# Patient Record
Sex: FEMALE | Race: BLACK OR AFRICAN AMERICAN | NOT HISPANIC OR LATINO | Employment: FULL TIME | URBAN - METROPOLITAN AREA
[De-identification: names, ages, dates, MRNs, and addresses within clinical notes are randomized per-mention and may not be internally consistent; named-entity substitution may affect disease eponyms.]

---

## 2018-01-23 ENCOUNTER — HOSPITAL ENCOUNTER (EMERGENCY)
Facility: OTHER | Age: 33
Discharge: HOME | End: 2018-01-23
Attending: INTERNAL MEDICINE
Payer: MEDICAID

## 2018-01-23 VITALS
WEIGHT: 218 LBS | RESPIRATION RATE: 18 BRPM | SYSTOLIC BLOOD PRESSURE: 191 MMHG | DIASTOLIC BLOOD PRESSURE: 84 MMHG | BODY MASS INDEX: 40.12 KG/M2 | HEART RATE: 71 BPM | HEIGHT: 62 IN | OXYGEN SATURATION: 98 %

## 2018-01-23 DIAGNOSIS — R52 PAIN IN SURGICAL SCAR: Primary | ICD-10-CM

## 2018-01-23 DIAGNOSIS — L90.5 PAIN IN SURGICAL SCAR: Primary | ICD-10-CM

## 2018-01-23 LAB
B-HCG UR QL: NEGATIVE
CTP QC/QA: YES

## 2018-01-23 PROCEDURE — 25000003 PHARM REV CODE 250: Performed by: NURSE PRACTITIONER

## 2018-01-23 PROCEDURE — 99283 EMERGENCY DEPT VISIT LOW MDM: CPT | Mod: 25

## 2018-01-23 PROCEDURE — 81025 URINE PREGNANCY TEST: CPT | Performed by: NURSE PRACTITIONER

## 2018-01-23 RX ORDER — IBUPROFEN 600 MG/1
600 TABLET ORAL
Status: COMPLETED | OUTPATIENT
Start: 2018-01-23 | End: 2018-01-23

## 2018-01-23 RX ORDER — LISINOPRIL AND HYDROCHLOROTHIAZIDE 12.5; 2 MG/1; MG/1
1 TABLET ORAL DAILY
COMMUNITY

## 2018-01-23 RX ORDER — NAPROXEN 500 MG/1
500 TABLET ORAL 2 TIMES DAILY PRN
Qty: 20 TABLET | Refills: 0 | Status: SHIPPED | OUTPATIENT
Start: 2018-01-23 | End: 2021-03-04

## 2018-01-23 RX ADMIN — IBUPROFEN 600 MG: 600 TABLET, FILM COATED ORAL at 09:01

## 2018-01-24 NOTE — ED NOTES
Patient has verified the spelling of their name and  on armband    LOC: The patient is awake, alert, and aware of environment with an appropriate affect, the patient is oriented x 4 and speaking appropriately.     APPEARANCE: Patient resting comfortably and in no acute distress, patient is clean and well groomed, patient's clothing is properly fastened.     RESPIRATORY: Airway is open and patent, respirations are spontaneous, patient has a normal effort and rate, no accessory muscle use noted, bilateral breath sounds clear, denies SOB     CARDIAC:  No chest pain.     SKIN: The skin is warm and dry, color consistent with ethnicity, patient has normal skin turgor and moist mucus membranes, skin intact, no breakdown or bruising noted.     NEUROLOGICAL: No headache, dizziness, syncope, paralysis, ataxia, numbness or tingling in the extremities.     MUSCULOSKELETAL: +left side groin pain.     COMPLAINT: Patient presented to the ED for groin pain to left side x 1 day. Patient also reported not taking BP medications today. Patient takes Lisinopril/HTCZ 20/12.5mg.

## 2018-01-24 NOTE — ED PROVIDER NOTES
Encounter Date: 1/23/2018       History     Chief Complaint   Patient presents with    Groin Pain     Pt states she woke with L sided groin pain, worse with bending over or cough. Denies dysuria. Pt states last BM was 2 days ago. Denies n/v/d. Pt has not taking BP meds today.      A 33-year-old female presents to the ED with lower left abdominal pain which started today.  Patient states the pain increases with bending.  Patient denies dysuria.  Patient denies nausea vomiting.  Patient denies vaginal discharge.  Patient has a history of hypertension.  Patient has had 3 surgeries to lower abdomen.  Her last surgery was 2016. B/P 235/117. Pt did not take B/P medications today. Pt states she takes her blood pressure medicine at night.  Patient denies headache dizziness or blurred vision.      The history is provided by the patient.   Groin Pain   This is a new problem. The current episode started 12 to 24 hours ago. The problem has been gradually worsening. Pertinent negatives include no chest pain and no shortness of breath. The symptoms are aggravated by bending, twisting and walking. Nothing relieves the symptoms.     Review of patient's allergies indicates:  No Known Allergies  History reviewed. No pertinent past medical history.  No past surgical history on file.  History reviewed. No pertinent family history.  Social History   Substance Use Topics    Smoking status: Not on file    Smokeless tobacco: Not on file    Alcohol use Not on file     Review of Systems   Constitutional: Negative for fever.   HENT: Negative for sore throat.    Respiratory: Negative for shortness of breath.    Cardiovascular: Negative for chest pain.   Gastrointestinal: Negative for nausea.        Left lower abdominal pain    Genitourinary: Negative for dysuria.   Musculoskeletal: Negative for back pain.   Skin: Negative for rash.   Neurological: Negative for weakness.   Hematological: Does not bruise/bleed easily.   All other systems  reviewed and are negative.      Physical Exam     Initial Vitals [01/23/18 2017]   BP Pulse Resp Temp SpO2   (!) 235/117 66 20 -- 99 %      MAP       156.33         Physical Exam    Nursing note and vitals reviewed.  Constitutional: Vital signs are normal. She appears well-developed. She is cooperative. She does not appear ill.   HENT:   Head: Normocephalic and atraumatic.   Right Ear: External ear normal.   Left Ear: External ear normal.   Nose: Nose normal.   Mouth/Throat: Oropharynx is clear and moist.   Eyes: Conjunctivae and lids are normal. Pupils are equal, round, and reactive to light.   Neck: Normal range of motion. Neck supple.   Cardiovascular: Normal rate, regular rhythm, S1 normal, S2 normal and normal heart sounds.   Pulmonary/Chest: Effort normal and breath sounds normal.   Abdominal: Soft. Normal appearance and bowel sounds are normal. There is tenderness.       Musculoskeletal: Normal range of motion.   From of all extremities   Neurological: She is alert and oriented to person, place, and time.   Skin: Skin is warm, dry and intact.   Psychiatric: She has a normal mood and affect. Her speech is normal. Thought content normal. Cognition and memory are normal.         ED Course   Procedures  Labs Reviewed - No data to display          Medical Decision Making:   Initial Assessment:   A 33-year-old female who presents to the ED with left lower abdominal pain which started today.  Patient states the pain increases with bending twisting and walking.  Patient denies dysuria, vaginal discharge, nausea vomiting.  BP recent assess. B/P 191/81. She takes her B/P medications at night.   Differential Diagnosis:   Muscle sprain  Scar tissues  Inguinal hernia   Clinical Tests:   Lab Tests: Ordered and Reviewed  ED Management:  Physical exam. Motrin 600 mg orally.  Physical exam consistent with possible scar tissue to all surgical incisions.  Patient to be discharged home with Naprosyn when necessary pain.  Follow  with PCP in 2-3 days.  Return to ED if symptoms worsen. Monitor B/P. Take medications every am.                    ED Course      Clinical Impression:   The encounter diagnosis was Pain in surgical scar.                           SHAHID Locke  01/23/18 [id number]

## 2021-03-04 ENCOUNTER — HOSPITAL ENCOUNTER (EMERGENCY)
Facility: HOSPITAL | Age: 36
Discharge: HOME | End: 2021-03-04
Attending: EMERGENCY MEDICINE
Payer: MEDICAID

## 2021-03-04 VITALS
BODY MASS INDEX: 42.48 KG/M2 | TEMPERATURE: 100 F | DIASTOLIC BLOOD PRESSURE: 80 MMHG | HEIGHT: 61 IN | OXYGEN SATURATION: 99 % | HEART RATE: 64 BPM | WEIGHT: 225 LBS | RESPIRATION RATE: 18 BRPM | SYSTOLIC BLOOD PRESSURE: 132 MMHG

## 2021-03-04 DIAGNOSIS — J30.9 ALLERGIC RHINITIS, UNSPECIFIED SEASONALITY, UNSPECIFIED TRIGGER: ICD-10-CM

## 2021-03-04 DIAGNOSIS — R07.9 CHEST PAIN: ICD-10-CM

## 2021-03-04 DIAGNOSIS — U07.1 COVID-19 VIRUS INFECTION: Primary | ICD-10-CM

## 2021-03-04 DIAGNOSIS — D64.9 ANEMIA, UNSPECIFIED TYPE: ICD-10-CM

## 2021-03-04 DIAGNOSIS — E87.6 HYPOKALEMIA: ICD-10-CM

## 2021-03-04 LAB
ALBUMIN SERPL-MCNC: 3.9 G/DL (ref 3.3–5.5)
ALLENS TEST: ABNORMAL
ALP SERPL-CCNC: 36 U/L (ref 42–141)
B-HCG UR QL: NEGATIVE
BILIRUB SERPL-MCNC: 0.4 MG/DL (ref 0.2–1.6)
BILIRUBIN, POC UA: NEGATIVE
BLOOD, POC UA: ABNORMAL
BUN SERPL-MCNC: 7 MG/DL (ref 7–22)
CALCIUM SERPL-MCNC: 9.2 MG/DL (ref 8–10.3)
CHLORIDE SERPL-SCNC: 101 MMOL/L (ref 98–108)
CLARITY, POC UA: ABNORMAL
COLOR, POC UA: ABNORMAL
CREAT SERPL-MCNC: 0.7 MG/DL (ref 0.6–1.2)
CTP QC/QA: YES
CTP QC/QA: YES
GLUCOSE SERPL-MCNC: 100 MG/DL (ref 73–118)
GLUCOSE, POC UA: NEGATIVE
HCO3 UR-SCNC: 25.9 MMOL/L (ref 24–28)
INFLUENZA A ANTIGEN, POC: NEGATIVE
INFLUENZA B ANTIGEN, POC: NEGATIVE
KETONES, POC UA: NEGATIVE
LDH SERPL L TO P-CCNC: 0.9 MMOL/L (ref 0.5–2.2)
LEUKOCYTE EST, POC UA: NEGATIVE
NITRITE, POC UA: NEGATIVE
PCO2 BLDA: 29.9 MMHG (ref 35–45)
PH SMN: 7.54 [PH] (ref 7.35–7.45)
PH UR STRIP: 5 [PH]
PO2 BLDA: 114 MMHG (ref 40–60)
POC ALT (SGPT): 13 U/L (ref 10–47)
POC AST (SGOT): 29 U/L (ref 11–38)
POC B-TYPE NATRIURETIC PEPTIDE: <5 PG/ML (ref 0–100)
POC BE: 4 MMOL/L
POC CARDIAC TROPONIN I: 0 NG/ML
POC PTINR: 1 (ref 0.9–1.2)
POC PTWBT: 12.5 SEC (ref 9.7–14.3)
POC RAPID STREP A: NEGATIVE
POC SATURATED O2: 99 % (ref 95–100)
POC TCO2: 26 MMOL/L (ref 18–33)
POC TCO2: 27 MMOL/L (ref 24–29)
POTASSIUM BLD-SCNC: 3.2 MMOL/L (ref 3.6–5.1)
PROTEIN, POC UA: ABNORMAL
PROTEIN, POC: 7.7 G/DL (ref 6.4–8.1)
SAMPLE: ABNORMAL
SAMPLE: NORMAL
SAMPLE: NORMAL
SARS-COV-2 RDRP RESP QL NAA+PROBE: POSITIVE
SITE: ABNORMAL
SODIUM BLD-SCNC: 137 MMOL/L (ref 128–145)
SPECIFIC GRAVITY, POC UA: 1.02
UROBILINOGEN, POC UA: 0.2 E.U./DL

## 2021-03-04 PROCEDURE — 82803 BLOOD GASES ANY COMBINATION: CPT | Mod: ER

## 2021-03-04 PROCEDURE — 25000003 PHARM REV CODE 250: Mod: ER | Performed by: NURSE PRACTITIONER

## 2021-03-04 PROCEDURE — 81003 URINALYSIS AUTO W/O SCOPE: CPT | Mod: ER

## 2021-03-04 PROCEDURE — 80053 COMPREHEN METABOLIC PANEL: CPT | Mod: ER

## 2021-03-04 PROCEDURE — U0002 COVID-19 LAB TEST NON-CDC: HCPCS | Mod: ER | Performed by: NURSE PRACTITIONER

## 2021-03-04 PROCEDURE — 85610 PROTHROMBIN TIME: CPT | Mod: ER

## 2021-03-04 PROCEDURE — 85025 COMPLETE CBC W/AUTO DIFF WBC: CPT | Mod: ER

## 2021-03-04 PROCEDURE — 81025 URINE PREGNANCY TEST: CPT | Mod: ER | Performed by: NURSE PRACTITIONER

## 2021-03-04 PROCEDURE — 87502 INFLUENZA DNA AMP PROBE: CPT | Mod: ER

## 2021-03-04 PROCEDURE — 99284 EMERGENCY DEPT VISIT MOD MDM: CPT | Mod: 25,ER

## 2021-03-04 PROCEDURE — 93005 ELECTROCARDIOGRAM TRACING: CPT | Mod: ER

## 2021-03-04 PROCEDURE — 84484 ASSAY OF TROPONIN QUANT: CPT | Mod: ER

## 2021-03-04 PROCEDURE — 93010 ELECTROCARDIOGRAM REPORT: CPT | Mod: ,,, | Performed by: INTERNAL MEDICINE

## 2021-03-04 PROCEDURE — 93010 EKG 12-LEAD: ICD-10-PCS | Mod: ,,, | Performed by: INTERNAL MEDICINE

## 2021-03-04 PROCEDURE — 83880 ASSAY OF NATRIURETIC PEPTIDE: CPT | Mod: ER

## 2021-03-04 RX ORDER — DEXTROMETHORPHAN HYDROBROMIDE, GUAIFENESIN 5; 100 MG/5ML; MG/5ML
650 LIQUID ORAL EVERY 8 HOURS
Qty: 20 TABLET | Refills: 0 | Status: SHIPPED | OUTPATIENT
Start: 2021-03-04

## 2021-03-04 RX ORDER — POTASSIUM CHLORIDE 20 MEQ/1
40 TABLET, EXTENDED RELEASE ORAL
Status: COMPLETED | OUTPATIENT
Start: 2021-03-04 | End: 2021-03-04

## 2021-03-04 RX ORDER — IBUPROFEN 600 MG/1
600 TABLET ORAL EVERY 6 HOURS PRN
Qty: 20 TABLET | Refills: 0 | Status: ON HOLD | OUTPATIENT
Start: 2021-03-04 | End: 2024-03-11 | Stop reason: HOSPADM

## 2021-03-04 RX ORDER — FLUTICASONE PROPIONATE 50 MCG
1 SPRAY, SUSPENSION (ML) NASAL 2 TIMES DAILY PRN
Qty: 16 G | Refills: 0 | Status: SHIPPED | OUTPATIENT
Start: 2021-03-04

## 2021-03-04 RX ORDER — CETIRIZINE HYDROCHLORIDE 10 MG/1
10 TABLET ORAL DAILY
Qty: 30 TABLET | Refills: 0 | Status: SHIPPED | OUTPATIENT
Start: 2021-03-04 | End: 2022-03-04

## 2021-03-04 RX ORDER — IBUPROFEN 600 MG/1
600 TABLET ORAL
Status: COMPLETED | OUTPATIENT
Start: 2021-03-04 | End: 2021-03-04

## 2021-03-04 RX ORDER — ACETAMINOPHEN 500 MG
1000 TABLET ORAL
Status: COMPLETED | OUTPATIENT
Start: 2021-03-04 | End: 2021-03-04

## 2021-03-04 RX ADMIN — IBUPROFEN 600 MG: 600 TABLET ORAL at 06:03

## 2021-03-04 RX ADMIN — ACETAMINOPHEN 1000 MG: 500 TABLET ORAL at 06:03

## 2021-03-04 RX ADMIN — POTASSIUM CHLORIDE 40 MEQ: 1500 TABLET, EXTENDED RELEASE ORAL at 06:03

## 2021-03-05 ENCOUNTER — NURSE TRIAGE (OUTPATIENT)
Dept: ADMINISTRATIVE | Facility: CLINIC | Age: 36
End: 2021-03-05

## 2021-03-05 ENCOUNTER — PATIENT MESSAGE (OUTPATIENT)
Dept: ADMINISTRATIVE | Facility: OTHER | Age: 36
End: 2021-03-05

## 2021-03-06 ENCOUNTER — PATIENT MESSAGE (OUTPATIENT)
Dept: ADMINISTRATIVE | Facility: CLINIC | Age: 36
End: 2021-03-06

## 2021-03-06 ENCOUNTER — NURSE TRIAGE (OUTPATIENT)
Dept: ADMINISTRATIVE | Facility: CLINIC | Age: 36
End: 2021-03-06

## 2021-03-06 ENCOUNTER — PATIENT MESSAGE (OUTPATIENT)
Dept: ADMINISTRATIVE | Facility: OTHER | Age: 36
End: 2021-03-06

## 2021-03-07 ENCOUNTER — PATIENT MESSAGE (OUTPATIENT)
Dept: ADMINISTRATIVE | Facility: OTHER | Age: 36
End: 2021-03-07

## 2021-03-08 ENCOUNTER — PATIENT MESSAGE (OUTPATIENT)
Dept: ADMINISTRATIVE | Facility: CLINIC | Age: 36
End: 2021-03-08

## 2021-03-08 ENCOUNTER — NURSE TRIAGE (OUTPATIENT)
Dept: ADMINISTRATIVE | Facility: CLINIC | Age: 36
End: 2021-03-08

## 2021-03-08 ENCOUNTER — PATIENT MESSAGE (OUTPATIENT)
Dept: ADMINISTRATIVE | Facility: OTHER | Age: 36
End: 2021-03-08

## 2021-03-09 ENCOUNTER — NURSE TRIAGE (OUTPATIENT)
Dept: ADMINISTRATIVE | Facility: CLINIC | Age: 36
End: 2021-03-09

## 2021-03-09 ENCOUNTER — PATIENT MESSAGE (OUTPATIENT)
Dept: ADMINISTRATIVE | Facility: OTHER | Age: 36
End: 2021-03-09

## 2021-03-09 ENCOUNTER — PATIENT MESSAGE (OUTPATIENT)
Dept: ADMINISTRATIVE | Facility: CLINIC | Age: 36
End: 2021-03-09

## 2021-03-10 ENCOUNTER — NURSE TRIAGE (OUTPATIENT)
Dept: ADMINISTRATIVE | Facility: CLINIC | Age: 36
End: 2021-03-10

## 2021-03-10 ENCOUNTER — HOSPITAL ENCOUNTER (INPATIENT)
Facility: HOSPITAL | Age: 36
LOS: 3 days | Discharge: HOME | DRG: 177 | End: 2021-03-13
Attending: EMERGENCY MEDICINE | Admitting: STUDENT IN AN ORGANIZED HEALTH CARE EDUCATION/TRAINING PROGRAM
Payer: MEDICAID

## 2021-03-10 ENCOUNTER — TELEPHONE (OUTPATIENT)
Dept: ADMINISTRATIVE | Facility: CLINIC | Age: 36
End: 2021-03-10

## 2021-03-10 ENCOUNTER — PATIENT MESSAGE (OUTPATIENT)
Dept: ADMINISTRATIVE | Facility: OTHER | Age: 36
End: 2021-03-10

## 2021-03-10 DIAGNOSIS — U07.1 COVID-19 VIRUS INFECTION: Primary | ICD-10-CM

## 2021-03-10 DIAGNOSIS — Z20.822 SUSPECTED COVID-19 VIRUS INFECTION: ICD-10-CM

## 2021-03-10 PROBLEM — J12.82 PNEUMONIA DUE TO COVID-19 VIRUS: Status: ACTIVE | Noted: 2021-03-10

## 2021-03-10 LAB
ALBUMIN SERPL BCP-MCNC: 3.4 G/DL (ref 3.5–5.2)
ALP SERPL-CCNC: 38 U/L (ref 55–135)
ALT SERPL W/O P-5'-P-CCNC: 17 U/L (ref 10–44)
ANION GAP SERPL CALC-SCNC: 10 MMOL/L (ref 8–16)
AST SERPL-CCNC: 33 U/L (ref 10–40)
BASOPHILS # BLD AUTO: 0.01 K/UL (ref 0–0.2)
BASOPHILS NFR BLD: 0.4 % (ref 0–1.9)
BILIRUB SERPL-MCNC: 0.2 MG/DL (ref 0.1–1)
BNP SERPL-MCNC: 16 PG/ML (ref 0–99)
BUN SERPL-MCNC: 6 MG/DL (ref 6–20)
CALCIUM SERPL-MCNC: 8 MG/DL (ref 8.7–10.5)
CHLORIDE SERPL-SCNC: 105 MMOL/L (ref 95–110)
CK SERPL-CCNC: 568 U/L (ref 20–180)
CO2 SERPL-SCNC: 24 MMOL/L (ref 23–29)
CREAT SERPL-MCNC: 0.8 MG/DL (ref 0.5–1.4)
CRP SERPL-MCNC: 34.2 MG/L (ref 0–8.2)
DIFFERENTIAL METHOD: ABNORMAL
EOSINOPHIL # BLD AUTO: 0 K/UL (ref 0–0.5)
EOSINOPHIL NFR BLD: 0 % (ref 0–8)
ERYTHROCYTE [DISTWIDTH] IN BLOOD BY AUTOMATED COUNT: 15.9 % (ref 11.5–14.5)
ERYTHROCYTE [SEDIMENTATION RATE] IN BLOOD BY WESTERGREN METHOD: 62 MM/HR (ref 0–20)
EST. GFR  (AFRICAN AMERICAN): >60 ML/MIN/1.73 M^2
EST. GFR  (NON AFRICAN AMERICAN): >60 ML/MIN/1.73 M^2
FERRITIN SERPL-MCNC: 45 NG/ML (ref 20–300)
GLUCOSE SERPL-MCNC: 96 MG/DL (ref 70–110)
HCT VFR BLD AUTO: 26.9 % (ref 37–48.5)
HGB BLD-MCNC: 8.7 G/DL (ref 12–16)
IMM GRANULOCYTES # BLD AUTO: 0.01 K/UL (ref 0–0.04)
IMM GRANULOCYTES NFR BLD AUTO: 0.4 % (ref 0–0.5)
LACTATE SERPL-SCNC: 0.5 MMOL/L (ref 0.5–2.2)
LDH SERPL L TO P-CCNC: 228 U/L (ref 110–260)
LYMPHOCYTES # BLD AUTO: 1.4 K/UL (ref 1–4.8)
LYMPHOCYTES NFR BLD: 48.6 % (ref 18–48)
MCH RBC QN AUTO: 27.6 PG (ref 27–31)
MCHC RBC AUTO-ENTMCNC: 32.3 G/DL (ref 32–36)
MCV RBC AUTO: 85 FL (ref 82–98)
MONOCYTES # BLD AUTO: 0.2 K/UL (ref 0.3–1)
MONOCYTES NFR BLD: 6.8 % (ref 4–15)
NEUTROPHILS # BLD AUTO: 1.2 K/UL (ref 1.8–7.7)
NEUTROPHILS NFR BLD: 43.8 % (ref 38–73)
NRBC BLD-RTO: 0 /100 WBC
PLATELET # BLD AUTO: 247 K/UL (ref 150–350)
PMV BLD AUTO: 9.7 FL (ref 9.2–12.9)
POTASSIUM SERPL-SCNC: 3.7 MMOL/L (ref 3.5–5.1)
PROCALCITONIN SERPL IA-MCNC: 0.02 NG/ML
PROT SERPL-MCNC: 7.3 G/DL (ref 6–8.4)
RBC # BLD AUTO: 3.15 M/UL (ref 4–5.4)
SODIUM SERPL-SCNC: 139 MMOL/L (ref 136–145)
TROPONIN I SERPL DL<=0.01 NG/ML-MCNC: <0.006 NG/ML (ref 0–0.03)
WBC # BLD AUTO: 2.8 K/UL (ref 3.9–12.7)

## 2021-03-10 PROCEDURE — 63600175 PHARM REV CODE 636 W HCPCS: Performed by: HOSPITALIST

## 2021-03-10 PROCEDURE — 25000003 PHARM REV CODE 250: Performed by: HOSPITALIST

## 2021-03-10 PROCEDURE — 84145 PROCALCITONIN (PCT): CPT | Performed by: EMERGENCY MEDICINE

## 2021-03-10 PROCEDURE — 94640 AIRWAY INHALATION TREATMENT: CPT

## 2021-03-10 PROCEDURE — 80053 COMPREHEN METABOLIC PANEL: CPT | Performed by: EMERGENCY MEDICINE

## 2021-03-10 PROCEDURE — 99900035 HC TECH TIME PER 15 MIN (STAT)

## 2021-03-10 PROCEDURE — G0378 HOSPITAL OBSERVATION PER HR: HCPCS

## 2021-03-10 PROCEDURE — 94761 N-INVAS EAR/PLS OXIMETRY MLT: CPT

## 2021-03-10 PROCEDURE — 93010 ELECTROCARDIOGRAM REPORT: CPT | Mod: ,,, | Performed by: INTERNAL MEDICINE

## 2021-03-10 PROCEDURE — 25000242 PHARM REV CODE 250 ALT 637 W/ HCPCS: Performed by: HOSPITALIST

## 2021-03-10 PROCEDURE — 84484 ASSAY OF TROPONIN QUANT: CPT | Performed by: EMERGENCY MEDICINE

## 2021-03-10 PROCEDURE — 85025 COMPLETE CBC W/AUTO DIFF WBC: CPT | Performed by: EMERGENCY MEDICINE

## 2021-03-10 PROCEDURE — 96374 THER/PROPH/DIAG INJ IV PUSH: CPT | Performed by: EMERGENCY MEDICINE

## 2021-03-10 PROCEDURE — 94799 UNLISTED PULMONARY SVC/PX: CPT

## 2021-03-10 PROCEDURE — 93005 ELECTROCARDIOGRAM TRACING: CPT

## 2021-03-10 PROCEDURE — 86140 C-REACTIVE PROTEIN: CPT | Performed by: EMERGENCY MEDICINE

## 2021-03-10 PROCEDURE — 85652 RBC SED RATE AUTOMATED: CPT | Performed by: HOSPITALIST

## 2021-03-10 PROCEDURE — 83615 LACTATE (LD) (LDH) ENZYME: CPT | Performed by: EMERGENCY MEDICINE

## 2021-03-10 PROCEDURE — 83880 ASSAY OF NATRIURETIC PEPTIDE: CPT | Performed by: EMERGENCY MEDICINE

## 2021-03-10 PROCEDURE — 82550 ASSAY OF CK (CPK): CPT | Performed by: EMERGENCY MEDICINE

## 2021-03-10 PROCEDURE — 82306 VITAMIN D 25 HYDROXY: CPT | Performed by: HOSPITALIST

## 2021-03-10 PROCEDURE — 99285 EMERGENCY DEPT VISIT HI MDM: CPT | Mod: 25

## 2021-03-10 PROCEDURE — 82728 ASSAY OF FERRITIN: CPT | Performed by: EMERGENCY MEDICINE

## 2021-03-10 PROCEDURE — 11000001 HC ACUTE MED/SURG PRIVATE ROOM

## 2021-03-10 PROCEDURE — 93010 EKG 12-LEAD: ICD-10-PCS | Mod: ,,, | Performed by: INTERNAL MEDICINE

## 2021-03-10 PROCEDURE — 96372 THER/PROPH/DIAG INJ SC/IM: CPT | Mod: 59 | Performed by: EMERGENCY MEDICINE

## 2021-03-10 PROCEDURE — 83605 ASSAY OF LACTIC ACID: CPT | Performed by: EMERGENCY MEDICINE

## 2021-03-10 RX ORDER — IBUPROFEN 200 MG
16 TABLET ORAL
Status: DISCONTINUED | OUTPATIENT
Start: 2021-03-10 | End: 2021-03-13 | Stop reason: HOSPADM

## 2021-03-10 RX ORDER — LISINOPRIL 20 MG/1
20 TABLET ORAL DAILY
Status: DISCONTINUED | OUTPATIENT
Start: 2021-03-11 | End: 2021-03-10

## 2021-03-10 RX ORDER — ALBUTEROL SULFATE 90 UG/1
2 AEROSOL, METERED RESPIRATORY (INHALATION) EVERY 6 HOURS
Status: DISCONTINUED | OUTPATIENT
Start: 2021-03-10 | End: 2021-03-11

## 2021-03-10 RX ORDER — LISINOPRIL 20 MG/1
40 TABLET ORAL DAILY
Status: DISCONTINUED | OUTPATIENT
Start: 2021-03-11 | End: 2021-03-11

## 2021-03-10 RX ORDER — HYDROCHLOROTHIAZIDE 12.5 MG/1
12.5 TABLET ORAL DAILY
Status: DISCONTINUED | OUTPATIENT
Start: 2021-03-12 | End: 2021-03-11

## 2021-03-10 RX ORDER — IBUPROFEN 200 MG
24 TABLET ORAL
Status: DISCONTINUED | OUTPATIENT
Start: 2021-03-10 | End: 2021-03-13 | Stop reason: HOSPADM

## 2021-03-10 RX ORDER — SODIUM CHLORIDE 0.9 % (FLUSH) 0.9 %
10 SYRINGE (ML) INJECTION
Status: DISCONTINUED | OUTPATIENT
Start: 2021-03-10 | End: 2021-03-13 | Stop reason: HOSPADM

## 2021-03-10 RX ORDER — GLUCAGON 1 MG
1 KIT INJECTION
Status: DISCONTINUED | OUTPATIENT
Start: 2021-03-10 | End: 2021-03-13 | Stop reason: HOSPADM

## 2021-03-10 RX ORDER — ACETAMINOPHEN 325 MG/1
650 TABLET ORAL EVERY 6 HOURS PRN
Status: DISCONTINUED | OUTPATIENT
Start: 2021-03-10 | End: 2021-03-13 | Stop reason: HOSPADM

## 2021-03-10 RX ORDER — TALC
6 POWDER (GRAM) TOPICAL NIGHTLY PRN
Status: DISCONTINUED | OUTPATIENT
Start: 2021-03-10 | End: 2021-03-13 | Stop reason: HOSPADM

## 2021-03-10 RX ORDER — ENOXAPARIN SODIUM 100 MG/ML
40 INJECTION SUBCUTANEOUS EVERY 24 HOURS
Status: DISCONTINUED | OUTPATIENT
Start: 2021-03-10 | End: 2021-03-13 | Stop reason: HOSPADM

## 2021-03-10 RX ORDER — HYDROCHLOROTHIAZIDE 12.5 MG/1
12.5 TABLET ORAL DAILY
Status: DISCONTINUED | OUTPATIENT
Start: 2021-03-11 | End: 2021-03-10

## 2021-03-10 RX ORDER — ASCORBIC ACID 500 MG
500 TABLET ORAL 2 TIMES DAILY
Status: DISCONTINUED | OUTPATIENT
Start: 2021-03-10 | End: 2021-03-13 | Stop reason: HOSPADM

## 2021-03-10 RX ADMIN — GUAIFENESIN AND DEXTROMETHORPHAN HYDROBROMIDE 1 TABLET: 600; 30 TABLET, EXTENDED RELEASE ORAL at 06:03

## 2021-03-10 RX ADMIN — DEXAMETHASONE 6 MG: 4 TABLET ORAL at 01:03

## 2021-03-10 RX ADMIN — REMDESIVIR 200 MG: 100 INJECTION, POWDER, LYOPHILIZED, FOR SOLUTION INTRAVENOUS at 05:03

## 2021-03-10 RX ADMIN — THERA TABS 1 TABLET: TAB at 01:03

## 2021-03-10 RX ADMIN — ACETAMINOPHEN 650 MG: 325 TABLET ORAL at 05:03

## 2021-03-10 RX ADMIN — MELATONIN TAB 3 MG 6 MG: 3 TAB at 09:03

## 2021-03-10 RX ADMIN — OXYCODONE HYDROCHLORIDE AND ACETAMINOPHEN 500 MG: 500 TABLET ORAL at 09:03

## 2021-03-10 RX ADMIN — ALBUTEROL SULFATE 2 PUFF: 90 AEROSOL, METERED RESPIRATORY (INHALATION) at 07:03

## 2021-03-10 RX ADMIN — ENOXAPARIN SODIUM 40 MG: 40 INJECTION SUBCUTANEOUS at 05:03

## 2021-03-11 PROBLEM — M62.82 RHABDOMYOLYSIS: Status: ACTIVE | Noted: 2021-03-11

## 2021-03-11 LAB
25(OH)D3+25(OH)D2 SERPL-MCNC: 15 NG/ML (ref 30–96)
ALBUMIN SERPL BCP-MCNC: 3.2 G/DL (ref 3.5–5.2)
ALP SERPL-CCNC: 37 U/L (ref 55–135)
ALT SERPL W/O P-5'-P-CCNC: 10 U/L (ref 10–44)
ANION GAP SERPL CALC-SCNC: 9 MMOL/L (ref 8–16)
AST SERPL-CCNC: 27 U/L (ref 10–40)
BASOPHILS # BLD AUTO: ABNORMAL K/UL (ref 0–0.2)
BASOPHILS NFR BLD: 0 % (ref 0–1.9)
BILIRUB SERPL-MCNC: 0.2 MG/DL (ref 0.1–1)
BUN SERPL-MCNC: 10 MG/DL (ref 6–20)
CALCIUM SERPL-MCNC: 8.3 MG/DL (ref 8.7–10.5)
CHLORIDE SERPL-SCNC: 102 MMOL/L (ref 95–110)
CK SERPL-CCNC: 411 U/L (ref 20–180)
CO2 SERPL-SCNC: 25 MMOL/L (ref 23–29)
CREAT SERPL-MCNC: 0.7 MG/DL (ref 0.5–1.4)
DIFFERENTIAL METHOD: ABNORMAL
EOSINOPHIL # BLD AUTO: ABNORMAL K/UL (ref 0–0.5)
EOSINOPHIL NFR BLD: 0 % (ref 0–8)
ERYTHROCYTE [DISTWIDTH] IN BLOOD BY AUTOMATED COUNT: 15.7 % (ref 11.5–14.5)
EST. GFR  (AFRICAN AMERICAN): >60 ML/MIN/1.73 M^2
EST. GFR  (NON AFRICAN AMERICAN): >60 ML/MIN/1.73 M^2
GLUCOSE SERPL-MCNC: 125 MG/DL (ref 70–110)
HCT VFR BLD AUTO: 29.7 % (ref 37–48.5)
HGB BLD-MCNC: 9.5 G/DL (ref 12–16)
IMM GRANULOCYTES # BLD AUTO: ABNORMAL K/UL (ref 0–0.04)
IMM GRANULOCYTES NFR BLD AUTO: ABNORMAL % (ref 0–0.5)
LYMPHOCYTES # BLD AUTO: ABNORMAL K/UL (ref 1–4.8)
LYMPHOCYTES NFR BLD: 36 % (ref 18–48)
MAGNESIUM SERPL-MCNC: 2.3 MG/DL (ref 1.6–2.6)
MCH RBC QN AUTO: 27.3 PG (ref 27–31)
MCHC RBC AUTO-ENTMCNC: 32 G/DL (ref 32–36)
MCV RBC AUTO: 85 FL (ref 82–98)
MONOCYTES # BLD AUTO: ABNORMAL K/UL (ref 0.3–1)
MONOCYTES NFR BLD: 8 % (ref 4–15)
NEUTROPHILS NFR BLD: 56 % (ref 38–73)
NRBC BLD-RTO: 0 /100 WBC
PHOSPHATE SERPL-MCNC: 3.4 MG/DL (ref 2.7–4.5)
PLATELET # BLD AUTO: 288 K/UL (ref 150–350)
PMV BLD AUTO: 9.6 FL (ref 9.2–12.9)
POTASSIUM SERPL-SCNC: 4.2 MMOL/L (ref 3.5–5.1)
PROT SERPL-MCNC: 7.6 G/DL (ref 6–8.4)
RBC # BLD AUTO: 3.48 M/UL (ref 4–5.4)
SODIUM SERPL-SCNC: 136 MMOL/L (ref 136–145)
WBC # BLD AUTO: 1.91 K/UL (ref 3.9–12.7)

## 2021-03-11 PROCEDURE — 25000242 PHARM REV CODE 250 ALT 637 W/ HCPCS: Performed by: HOSPITALIST

## 2021-03-11 PROCEDURE — 82550 ASSAY OF CK (CPK): CPT | Performed by: HOSPITALIST

## 2021-03-11 PROCEDURE — 11000001 HC ACUTE MED/SURG PRIVATE ROOM

## 2021-03-11 PROCEDURE — 94640 AIRWAY INHALATION TREATMENT: CPT

## 2021-03-11 PROCEDURE — 63600175 PHARM REV CODE 636 W HCPCS: Performed by: HOSPITALIST

## 2021-03-11 PROCEDURE — 80053 COMPREHEN METABOLIC PANEL: CPT | Performed by: HOSPITALIST

## 2021-03-11 PROCEDURE — 25000003 PHARM REV CODE 250: Performed by: NURSE PRACTITIONER

## 2021-03-11 PROCEDURE — 83735 ASSAY OF MAGNESIUM: CPT | Performed by: HOSPITALIST

## 2021-03-11 PROCEDURE — 85007 BL SMEAR W/DIFF WBC COUNT: CPT | Performed by: HOSPITALIST

## 2021-03-11 PROCEDURE — 25000003 PHARM REV CODE 250: Performed by: HOSPITALIST

## 2021-03-11 PROCEDURE — 63700000 PHARM REV CODE 250 ALT 637 W/O HCPCS: Performed by: NURSE PRACTITIONER

## 2021-03-11 PROCEDURE — 94799 UNLISTED PULMONARY SVC/PX: CPT

## 2021-03-11 PROCEDURE — 36415 COLL VENOUS BLD VENIPUNCTURE: CPT | Performed by: HOSPITALIST

## 2021-03-11 PROCEDURE — 84100 ASSAY OF PHOSPHORUS: CPT | Performed by: HOSPITALIST

## 2021-03-11 PROCEDURE — 94761 N-INVAS EAR/PLS OXIMETRY MLT: CPT

## 2021-03-11 PROCEDURE — 85027 COMPLETE CBC AUTOMATED: CPT | Performed by: HOSPITALIST

## 2021-03-11 RX ORDER — ALBUTEROL SULFATE 90 UG/1
2 AEROSOL, METERED RESPIRATORY (INHALATION)
Status: DISCONTINUED | OUTPATIENT
Start: 2021-03-11 | End: 2021-03-13 | Stop reason: HOSPADM

## 2021-03-11 RX ORDER — AMLODIPINE BESYLATE 5 MG/1
10 TABLET ORAL DAILY
Status: DISCONTINUED | OUTPATIENT
Start: 2021-03-12 | End: 2021-03-13 | Stop reason: HOSPADM

## 2021-03-11 RX ORDER — ALBUTEROL SULFATE 90 UG/1
2 AEROSOL, METERED RESPIRATORY (INHALATION)
Status: DISCONTINUED | OUTPATIENT
Start: 2021-03-11 | End: 2021-03-11

## 2021-03-11 RX ORDER — SODIUM CHLORIDE 9 MG/ML
INJECTION, SOLUTION INTRAVENOUS CONTINUOUS
Status: ACTIVE | OUTPATIENT
Start: 2021-03-11 | End: 2021-03-11

## 2021-03-11 RX ORDER — AZITHROMYCIN 250 MG/1
500 TABLET, FILM COATED ORAL ONCE
Status: COMPLETED | OUTPATIENT
Start: 2021-03-11 | End: 2021-03-11

## 2021-03-11 RX ORDER — AMOXICILLIN AND CLAVULANATE POTASSIUM 875; 125 MG/1; MG/1
1 TABLET, FILM COATED ORAL EVERY 12 HOURS
Status: DISCONTINUED | OUTPATIENT
Start: 2021-03-11 | End: 2021-03-13 | Stop reason: HOSPADM

## 2021-03-11 RX ADMIN — ENOXAPARIN SODIUM 40 MG: 40 INJECTION SUBCUTANEOUS at 04:03

## 2021-03-11 RX ADMIN — ALBUTEROL SULFATE 2 PUFF: 90 AEROSOL, METERED RESPIRATORY (INHALATION) at 07:03

## 2021-03-11 RX ADMIN — DEXAMETHASONE 6 MG: 4 TABLET ORAL at 08:03

## 2021-03-11 RX ADMIN — ALBUTEROL SULFATE 2 PUFF: 90 AEROSOL, METERED RESPIRATORY (INHALATION) at 03:03

## 2021-03-11 RX ADMIN — THERA TABS 1 TABLET: TAB at 08:03

## 2021-03-11 RX ADMIN — AMOXICILLIN AND CLAVULANATE POTASSIUM 1 TABLET: 875; 125 TABLET, FILM COATED ORAL at 09:03

## 2021-03-11 RX ADMIN — REMDESIVIR 100 MG: 100 INJECTION, POWDER, LYOPHILIZED, FOR SOLUTION INTRAVENOUS at 04:03

## 2021-03-11 RX ADMIN — OXYCODONE HYDROCHLORIDE AND ACETAMINOPHEN 500 MG: 500 TABLET ORAL at 08:03

## 2021-03-11 RX ADMIN — GUAIFENESIN AND DEXTROMETHORPHAN HYDROBROMIDE 1 TABLET: 600; 30 TABLET, EXTENDED RELEASE ORAL at 08:03

## 2021-03-11 RX ADMIN — ALBUTEROL SULFATE 2 PUFF: 90 AEROSOL, METERED RESPIRATORY (INHALATION) at 12:03

## 2021-03-11 RX ADMIN — AMOXICILLIN AND CLAVULANATE POTASSIUM 1 TABLET: 875; 125 TABLET, FILM COATED ORAL at 08:03

## 2021-03-11 RX ADMIN — OXYCODONE HYDROCHLORIDE AND ACETAMINOPHEN 500 MG: 500 TABLET ORAL at 09:03

## 2021-03-11 RX ADMIN — ALBUTEROL SULFATE 2 PUFF: 90 AEROSOL, METERED RESPIRATORY (INHALATION) at 11:03

## 2021-03-11 RX ADMIN — SODIUM CHLORIDE: 0.9 INJECTION, SOLUTION INTRAVENOUS at 10:03

## 2021-03-11 RX ADMIN — LISINOPRIL 40 MG: 20 TABLET ORAL at 08:03

## 2021-03-11 RX ADMIN — AZITHROMYCIN MONOHYDRATE 500 MG: 250 TABLET ORAL at 08:03

## 2021-03-12 LAB
ALBUMIN SERPL BCP-MCNC: 3.2 G/DL (ref 3.5–5.2)
ALP SERPL-CCNC: 40 U/L (ref 55–135)
ALT SERPL W/O P-5'-P-CCNC: 13 U/L (ref 10–44)
ANION GAP SERPL CALC-SCNC: 7 MMOL/L (ref 8–16)
AST SERPL-CCNC: 25 U/L (ref 10–40)
BASOPHILS # BLD AUTO: 0.01 K/UL (ref 0–0.2)
BASOPHILS NFR BLD: 0.1 % (ref 0–1.9)
BILIRUB SERPL-MCNC: 0.2 MG/DL (ref 0.1–1)
BUN SERPL-MCNC: 10 MG/DL (ref 6–20)
CALCIUM SERPL-MCNC: 8.2 MG/DL (ref 8.7–10.5)
CHLORIDE SERPL-SCNC: 106 MMOL/L (ref 95–110)
CO2 SERPL-SCNC: 24 MMOL/L (ref 23–29)
CREAT SERPL-MCNC: 0.7 MG/DL (ref 0.5–1.4)
D DIMER PPP IA.FEU-MCNC: <0.19 MG/L FEU
DIFFERENTIAL METHOD: ABNORMAL
EOSINOPHIL # BLD AUTO: 0 K/UL (ref 0–0.5)
EOSINOPHIL NFR BLD: 0 % (ref 0–8)
ERYTHROCYTE [DISTWIDTH] IN BLOOD BY AUTOMATED COUNT: 15.7 % (ref 11.5–14.5)
EST. GFR  (AFRICAN AMERICAN): >60 ML/MIN/1.73 M^2
EST. GFR  (NON AFRICAN AMERICAN): >60 ML/MIN/1.73 M^2
GLUCOSE SERPL-MCNC: 121 MG/DL (ref 70–110)
HCT VFR BLD AUTO: 28.7 % (ref 37–48.5)
HGB BLD-MCNC: 9 G/DL (ref 12–16)
IMM GRANULOCYTES # BLD AUTO: 0.01 K/UL (ref 0–0.04)
IMM GRANULOCYTES NFR BLD AUTO: 0.1 % (ref 0–0.5)
LYMPHOCYTES # BLD AUTO: 1.4 K/UL (ref 1–4.8)
LYMPHOCYTES NFR BLD: 20.3 % (ref 18–48)
MAGNESIUM SERPL-MCNC: 2.5 MG/DL (ref 1.6–2.6)
MCH RBC QN AUTO: 26.9 PG (ref 27–31)
MCHC RBC AUTO-ENTMCNC: 31.4 G/DL (ref 32–36)
MCV RBC AUTO: 86 FL (ref 82–98)
MONOCYTES # BLD AUTO: 0.5 K/UL (ref 0.3–1)
MONOCYTES NFR BLD: 7.6 % (ref 4–15)
NEUTROPHILS # BLD AUTO: 5 K/UL (ref 1.8–7.7)
NEUTROPHILS NFR BLD: 71.9 % (ref 38–73)
NRBC BLD-RTO: 0 /100 WBC
PHOSPHATE SERPL-MCNC: 3 MG/DL (ref 2.7–4.5)
PLATELET # BLD AUTO: 342 K/UL (ref 150–350)
PMV BLD AUTO: 9.8 FL (ref 9.2–12.9)
POTASSIUM SERPL-SCNC: 4.2 MMOL/L (ref 3.5–5.1)
PROT SERPL-MCNC: 7.3 G/DL (ref 6–8.4)
RBC # BLD AUTO: 3.34 M/UL (ref 4–5.4)
SODIUM SERPL-SCNC: 137 MMOL/L (ref 136–145)
WBC # BLD AUTO: 6.94 K/UL (ref 3.9–12.7)

## 2021-03-12 PROCEDURE — 94640 AIRWAY INHALATION TREATMENT: CPT

## 2021-03-12 PROCEDURE — 11000001 HC ACUTE MED/SURG PRIVATE ROOM

## 2021-03-12 PROCEDURE — 99233 SBSQ HOSP IP/OBS HIGH 50: CPT | Mod: ,,, | Performed by: HOSPITALIST

## 2021-03-12 PROCEDURE — 85379 FIBRIN DEGRADATION QUANT: CPT | Performed by: HOSPITALIST

## 2021-03-12 PROCEDURE — 94799 UNLISTED PULMONARY SVC/PX: CPT

## 2021-03-12 PROCEDURE — 84100 ASSAY OF PHOSPHORUS: CPT | Performed by: HOSPITALIST

## 2021-03-12 PROCEDURE — 63600175 PHARM REV CODE 636 W HCPCS: Performed by: HOSPITALIST

## 2021-03-12 PROCEDURE — 80053 COMPREHEN METABOLIC PANEL: CPT | Performed by: HOSPITALIST

## 2021-03-12 PROCEDURE — 36415 COLL VENOUS BLD VENIPUNCTURE: CPT | Performed by: HOSPITALIST

## 2021-03-12 PROCEDURE — 25000242 PHARM REV CODE 250 ALT 637 W/ HCPCS: Performed by: HOSPITALIST

## 2021-03-12 PROCEDURE — 85025 COMPLETE CBC W/AUTO DIFF WBC: CPT | Performed by: HOSPITALIST

## 2021-03-12 PROCEDURE — 25000003 PHARM REV CODE 250: Performed by: HOSPITALIST

## 2021-03-12 PROCEDURE — 94761 N-INVAS EAR/PLS OXIMETRY MLT: CPT

## 2021-03-12 PROCEDURE — 83735 ASSAY OF MAGNESIUM: CPT | Performed by: HOSPITALIST

## 2021-03-12 PROCEDURE — 99233 PR SUBSEQUENT HOSPITAL CARE,LEVL III: ICD-10-PCS | Mod: ,,, | Performed by: HOSPITALIST

## 2021-03-12 PROCEDURE — 25000003 PHARM REV CODE 250: Performed by: NURSE PRACTITIONER

## 2021-03-12 RX ADMIN — AMLODIPINE BESYLATE 10 MG: 5 TABLET ORAL at 08:03

## 2021-03-12 RX ADMIN — ALBUTEROL SULFATE 2 PUFF: 90 AEROSOL, METERED RESPIRATORY (INHALATION) at 07:03

## 2021-03-12 RX ADMIN — OXYCODONE HYDROCHLORIDE AND ACETAMINOPHEN 500 MG: 500 TABLET ORAL at 08:03

## 2021-03-12 RX ADMIN — AMOXICILLIN AND CLAVULANATE POTASSIUM 1 TABLET: 875; 125 TABLET, FILM COATED ORAL at 08:03

## 2021-03-12 RX ADMIN — ALBUTEROL SULFATE 2 PUFF: 90 AEROSOL, METERED RESPIRATORY (INHALATION) at 08:03

## 2021-03-12 RX ADMIN — GUAIFENESIN AND DEXTROMETHORPHAN HYDROBROMIDE 1 TABLET: 600; 30 TABLET, EXTENDED RELEASE ORAL at 10:03

## 2021-03-12 RX ADMIN — OXYCODONE HYDROCHLORIDE AND ACETAMINOPHEN 500 MG: 500 TABLET ORAL at 10:03

## 2021-03-12 RX ADMIN — GUAIFENESIN AND DEXTROMETHORPHAN HYDROBROMIDE 1 TABLET: 600; 30 TABLET, EXTENDED RELEASE ORAL at 06:03

## 2021-03-12 RX ADMIN — ENOXAPARIN SODIUM 40 MG: 40 INJECTION SUBCUTANEOUS at 05:03

## 2021-03-12 RX ADMIN — REMDESIVIR 100 MG: 100 INJECTION, POWDER, LYOPHILIZED, FOR SOLUTION INTRAVENOUS at 03:03

## 2021-03-12 RX ADMIN — DEXAMETHASONE 6 MG: 4 TABLET ORAL at 08:03

## 2021-03-12 RX ADMIN — AMOXICILLIN AND CLAVULANATE POTASSIUM 1 TABLET: 875; 125 TABLET, FILM COATED ORAL at 10:03

## 2021-03-12 RX ADMIN — THERA TABS 1 TABLET: TAB at 08:03

## 2021-03-12 RX ADMIN — ALBUTEROL SULFATE 2 PUFF: 90 AEROSOL, METERED RESPIRATORY (INHALATION) at 04:03

## 2021-03-12 RX ADMIN — ALBUTEROL SULFATE 2 PUFF: 90 AEROSOL, METERED RESPIRATORY (INHALATION) at 01:03

## 2021-03-13 ENCOUNTER — PATIENT MESSAGE (OUTPATIENT)
Dept: ADMINISTRATIVE | Facility: OTHER | Age: 36
End: 2021-03-13

## 2021-03-13 ENCOUNTER — NURSE TRIAGE (OUTPATIENT)
Dept: ADMINISTRATIVE | Facility: CLINIC | Age: 36
End: 2021-03-13

## 2021-03-13 ENCOUNTER — PATIENT MESSAGE (OUTPATIENT)
Dept: ADMINISTRATIVE | Facility: CLINIC | Age: 36
End: 2021-03-13

## 2021-03-13 VITALS
HEART RATE: 60 BPM | OXYGEN SATURATION: 93 % | RESPIRATION RATE: 19 BRPM | SYSTOLIC BLOOD PRESSURE: 130 MMHG | WEIGHT: 217.13 LBS | HEIGHT: 62 IN | BODY MASS INDEX: 39.96 KG/M2 | TEMPERATURE: 98 F | DIASTOLIC BLOOD PRESSURE: 79 MMHG

## 2021-03-13 LAB
ALBUMIN SERPL BCP-MCNC: 3.1 G/DL (ref 3.5–5.2)
ALP SERPL-CCNC: 37 U/L (ref 55–135)
ALT SERPL W/O P-5'-P-CCNC: 34 U/L (ref 10–44)
ANION GAP SERPL CALC-SCNC: 9 MMOL/L (ref 8–16)
AST SERPL-CCNC: 40 U/L (ref 10–40)
BASOPHILS # BLD AUTO: 0.01 K/UL (ref 0–0.2)
BASOPHILS NFR BLD: 0.1 % (ref 0–1.9)
BILIRUB SERPL-MCNC: 0.2 MG/DL (ref 0.1–1)
BUN SERPL-MCNC: 10 MG/DL (ref 6–20)
CALCIUM SERPL-MCNC: 8.2 MG/DL (ref 8.7–10.5)
CHLORIDE SERPL-SCNC: 108 MMOL/L (ref 95–110)
CO2 SERPL-SCNC: 23 MMOL/L (ref 23–29)
CREAT SERPL-MCNC: 0.7 MG/DL (ref 0.5–1.4)
CRP SERPL-MCNC: 8.5 MG/L (ref 0–8.2)
DIFFERENTIAL METHOD: ABNORMAL
EOSINOPHIL # BLD AUTO: 0 K/UL (ref 0–0.5)
EOSINOPHIL NFR BLD: 0 % (ref 0–8)
ERYTHROCYTE [DISTWIDTH] IN BLOOD BY AUTOMATED COUNT: 15.6 % (ref 11.5–14.5)
EST. GFR  (AFRICAN AMERICAN): >60 ML/MIN/1.73 M^2
EST. GFR  (NON AFRICAN AMERICAN): >60 ML/MIN/1.73 M^2
GLUCOSE SERPL-MCNC: 117 MG/DL (ref 70–110)
HCT VFR BLD AUTO: 29.3 % (ref 37–48.5)
HGB BLD-MCNC: 9.2 G/DL (ref 12–16)
IMM GRANULOCYTES # BLD AUTO: 0.02 K/UL (ref 0–0.04)
IMM GRANULOCYTES NFR BLD AUTO: 0.3 % (ref 0–0.5)
LYMPHOCYTES # BLD AUTO: 1.8 K/UL (ref 1–4.8)
LYMPHOCYTES NFR BLD: 24.8 % (ref 18–48)
MAGNESIUM SERPL-MCNC: 2.3 MG/DL (ref 1.6–2.6)
MCH RBC QN AUTO: 27.1 PG (ref 27–31)
MCHC RBC AUTO-ENTMCNC: 31.4 G/DL (ref 32–36)
MCV RBC AUTO: 86 FL (ref 82–98)
MONOCYTES # BLD AUTO: 0.5 K/UL (ref 0.3–1)
MONOCYTES NFR BLD: 7.1 % (ref 4–15)
NEUTROPHILS # BLD AUTO: 5 K/UL (ref 1.8–7.7)
NEUTROPHILS NFR BLD: 67.7 % (ref 38–73)
NRBC BLD-RTO: 0 /100 WBC
PHOSPHATE SERPL-MCNC: 3.1 MG/DL (ref 2.7–4.5)
PLATELET # BLD AUTO: 374 K/UL (ref 150–350)
PLATELET BLD QL SMEAR: ABNORMAL
PMV BLD AUTO: 9.4 FL (ref 9.2–12.9)
POTASSIUM SERPL-SCNC: 4.1 MMOL/L (ref 3.5–5.1)
PROT SERPL-MCNC: 7.1 G/DL (ref 6–8.4)
RBC # BLD AUTO: 3.4 M/UL (ref 4–5.4)
SODIUM SERPL-SCNC: 140 MMOL/L (ref 136–145)
WBC # BLD AUTO: 7.33 K/UL (ref 3.9–12.7)

## 2021-03-13 PROCEDURE — 84100 ASSAY OF PHOSPHORUS: CPT | Performed by: HOSPITALIST

## 2021-03-13 PROCEDURE — 86140 C-REACTIVE PROTEIN: CPT | Performed by: HOSPITALIST

## 2021-03-13 PROCEDURE — 83735 ASSAY OF MAGNESIUM: CPT | Performed by: HOSPITALIST

## 2021-03-13 PROCEDURE — 63600175 PHARM REV CODE 636 W HCPCS: Performed by: HOSPITALIST

## 2021-03-13 PROCEDURE — 25000003 PHARM REV CODE 250: Performed by: NURSE PRACTITIONER

## 2021-03-13 PROCEDURE — 25000003 PHARM REV CODE 250: Performed by: HOSPITALIST

## 2021-03-13 PROCEDURE — 85025 COMPLETE CBC W/AUTO DIFF WBC: CPT | Performed by: HOSPITALIST

## 2021-03-13 PROCEDURE — 25000242 PHARM REV CODE 250 ALT 637 W/ HCPCS: Performed by: HOSPITALIST

## 2021-03-13 PROCEDURE — 80053 COMPREHEN METABOLIC PANEL: CPT | Performed by: HOSPITALIST

## 2021-03-13 PROCEDURE — 94640 AIRWAY INHALATION TREATMENT: CPT

## 2021-03-13 PROCEDURE — 36415 COLL VENOUS BLD VENIPUNCTURE: CPT | Performed by: HOSPITALIST

## 2021-03-13 PROCEDURE — 94761 N-INVAS EAR/PLS OXIMETRY MLT: CPT

## 2021-03-13 RX ORDER — DEXAMETHASONE 6 MG/1
6 TABLET ORAL DAILY
Qty: 3 TABLET | Refills: 0 | Status: SHIPPED | OUTPATIENT
Start: 2021-03-14 | End: 2021-03-18

## 2021-03-13 RX ORDER — ALBUTEROL SULFATE 90 UG/1
2 AEROSOL, METERED RESPIRATORY (INHALATION) EVERY 4 HOURS
Qty: 18 G | Refills: 0 | Status: SHIPPED | OUTPATIENT
Start: 2021-03-13 | End: 2022-03-13

## 2021-03-13 RX ADMIN — ALBUTEROL SULFATE 2 PUFF: 90 AEROSOL, METERED RESPIRATORY (INHALATION) at 12:03

## 2021-03-13 RX ADMIN — THERA TABS 1 TABLET: TAB at 08:03

## 2021-03-13 RX ADMIN — AMLODIPINE BESYLATE 10 MG: 5 TABLET ORAL at 08:03

## 2021-03-13 RX ADMIN — DEXAMETHASONE 6 MG: 4 TABLET ORAL at 08:03

## 2021-03-13 RX ADMIN — AMOXICILLIN AND CLAVULANATE POTASSIUM 1 TABLET: 875; 125 TABLET, FILM COATED ORAL at 08:03

## 2021-03-13 RX ADMIN — OXYCODONE HYDROCHLORIDE AND ACETAMINOPHEN 500 MG: 500 TABLET ORAL at 08:03

## 2021-03-13 RX ADMIN — ALBUTEROL SULFATE 2 PUFF: 90 AEROSOL, METERED RESPIRATORY (INHALATION) at 08:03

## 2021-03-14 ENCOUNTER — PATIENT MESSAGE (OUTPATIENT)
Dept: ADMINISTRATIVE | Facility: OTHER | Age: 36
End: 2021-03-14

## 2021-03-14 ENCOUNTER — NURSE TRIAGE (OUTPATIENT)
Dept: ADMINISTRATIVE | Facility: CLINIC | Age: 36
End: 2021-03-14

## 2021-03-14 ENCOUNTER — PATIENT MESSAGE (OUTPATIENT)
Dept: ADMINISTRATIVE | Facility: CLINIC | Age: 36
End: 2021-03-14

## 2021-03-15 ENCOUNTER — NURSE TRIAGE (OUTPATIENT)
Dept: ADMINISTRATIVE | Facility: CLINIC | Age: 36
End: 2021-03-15

## 2021-03-15 ENCOUNTER — PATIENT MESSAGE (OUTPATIENT)
Dept: ADMINISTRATIVE | Facility: CLINIC | Age: 36
End: 2021-03-15

## 2021-03-15 ENCOUNTER — PATIENT MESSAGE (OUTPATIENT)
Dept: ADMINISTRATIVE | Facility: OTHER | Age: 36
End: 2021-03-15

## 2021-03-16 ENCOUNTER — NURSE TRIAGE (OUTPATIENT)
Dept: ADMINISTRATIVE | Facility: CLINIC | Age: 36
End: 2021-03-16

## 2021-03-16 ENCOUNTER — PATIENT MESSAGE (OUTPATIENT)
Dept: ADMINISTRATIVE | Facility: CLINIC | Age: 36
End: 2021-03-16

## 2021-03-16 ENCOUNTER — PATIENT MESSAGE (OUTPATIENT)
Dept: ADMINISTRATIVE | Facility: OTHER | Age: 36
End: 2021-03-16

## 2021-03-17 ENCOUNTER — PATIENT MESSAGE (OUTPATIENT)
Dept: ADMINISTRATIVE | Facility: CLINIC | Age: 36
End: 2021-03-17

## 2021-03-17 ENCOUNTER — PATIENT MESSAGE (OUTPATIENT)
Dept: ADMINISTRATIVE | Facility: OTHER | Age: 36
End: 2021-03-17

## 2021-03-17 ENCOUNTER — NURSE TRIAGE (OUTPATIENT)
Dept: ADMINISTRATIVE | Facility: CLINIC | Age: 36
End: 2021-03-17

## 2021-03-18 ENCOUNTER — PATIENT MESSAGE (OUTPATIENT)
Dept: ADMINISTRATIVE | Facility: OTHER | Age: 36
End: 2021-03-18

## 2021-03-18 ENCOUNTER — PATIENT MESSAGE (OUTPATIENT)
Dept: ADMINISTRATIVE | Facility: CLINIC | Age: 36
End: 2021-03-18

## 2021-03-18 ENCOUNTER — NURSE TRIAGE (OUTPATIENT)
Dept: ADMINISTRATIVE | Facility: CLINIC | Age: 36
End: 2021-03-18

## 2021-03-19 ENCOUNTER — NURSE TRIAGE (OUTPATIENT)
Dept: ADMINISTRATIVE | Facility: CLINIC | Age: 36
End: 2021-03-19

## 2021-03-19 ENCOUNTER — PATIENT MESSAGE (OUTPATIENT)
Dept: ADMINISTRATIVE | Facility: OTHER | Age: 36
End: 2021-03-19

## 2021-03-19 ENCOUNTER — PATIENT MESSAGE (OUTPATIENT)
Dept: ADMINISTRATIVE | Facility: CLINIC | Age: 36
End: 2021-03-19

## 2021-03-20 ENCOUNTER — PATIENT MESSAGE (OUTPATIENT)
Dept: ADMINISTRATIVE | Facility: OTHER | Age: 36
End: 2021-03-20

## 2021-03-20 ENCOUNTER — NURSE TRIAGE (OUTPATIENT)
Dept: ADMINISTRATIVE | Facility: CLINIC | Age: 36
End: 2021-03-20

## 2021-03-20 ENCOUNTER — PATIENT MESSAGE (OUTPATIENT)
Dept: ADMINISTRATIVE | Facility: CLINIC | Age: 36
End: 2021-03-20

## 2021-03-21 ENCOUNTER — NURSE TRIAGE (OUTPATIENT)
Dept: ADMINISTRATIVE | Facility: CLINIC | Age: 36
End: 2021-03-21

## 2021-03-21 ENCOUNTER — PATIENT MESSAGE (OUTPATIENT)
Dept: ADMINISTRATIVE | Facility: OTHER | Age: 36
End: 2021-03-21

## 2021-03-21 ENCOUNTER — PATIENT MESSAGE (OUTPATIENT)
Dept: ADMINISTRATIVE | Facility: CLINIC | Age: 36
End: 2021-03-21

## 2021-03-22 ENCOUNTER — PATIENT MESSAGE (OUTPATIENT)
Dept: ADMINISTRATIVE | Facility: OTHER | Age: 36
End: 2021-03-22

## 2021-03-23 ENCOUNTER — PATIENT MESSAGE (OUTPATIENT)
Dept: ADMINISTRATIVE | Facility: CLINIC | Age: 36
End: 2021-03-23

## 2021-03-23 ENCOUNTER — NURSE TRIAGE (OUTPATIENT)
Dept: ADMINISTRATIVE | Facility: CLINIC | Age: 36
End: 2021-03-23

## 2021-03-23 ENCOUNTER — PATIENT MESSAGE (OUTPATIENT)
Dept: ADMINISTRATIVE | Facility: OTHER | Age: 36
End: 2021-03-23

## 2021-03-24 ENCOUNTER — PATIENT MESSAGE (OUTPATIENT)
Dept: ADMINISTRATIVE | Facility: OTHER | Age: 36
End: 2021-03-24

## 2021-03-24 ENCOUNTER — NURSE TRIAGE (OUTPATIENT)
Dept: ADMINISTRATIVE | Facility: CLINIC | Age: 36
End: 2021-03-24

## 2021-03-24 ENCOUNTER — PATIENT MESSAGE (OUTPATIENT)
Dept: ADMINISTRATIVE | Facility: CLINIC | Age: 36
End: 2021-03-24

## 2021-03-25 ENCOUNTER — NURSE TRIAGE (OUTPATIENT)
Dept: ADMINISTRATIVE | Facility: CLINIC | Age: 36
End: 2021-03-25

## 2021-03-25 ENCOUNTER — PATIENT MESSAGE (OUTPATIENT)
Dept: ADMINISTRATIVE | Facility: CLINIC | Age: 36
End: 2021-03-25

## 2021-03-25 ENCOUNTER — PATIENT MESSAGE (OUTPATIENT)
Dept: ADMINISTRATIVE | Facility: OTHER | Age: 36
End: 2021-03-25

## 2021-03-26 ENCOUNTER — PATIENT MESSAGE (OUTPATIENT)
Dept: ADMINISTRATIVE | Facility: OTHER | Age: 36
End: 2021-03-26

## 2021-03-27 ENCOUNTER — NURSE TRIAGE (OUTPATIENT)
Dept: ADMINISTRATIVE | Facility: CLINIC | Age: 36
End: 2021-03-27

## 2021-03-27 ENCOUNTER — PATIENT MESSAGE (OUTPATIENT)
Dept: ADMINISTRATIVE | Facility: CLINIC | Age: 36
End: 2021-03-27

## 2021-03-27 ENCOUNTER — PATIENT MESSAGE (OUTPATIENT)
Dept: ADMINISTRATIVE | Facility: OTHER | Age: 36
End: 2021-03-27

## 2021-04-16 ENCOUNTER — PATIENT MESSAGE (OUTPATIENT)
Dept: RESEARCH | Facility: HOSPITAL | Age: 36
End: 2021-04-16

## 2021-07-01 ENCOUNTER — PATIENT MESSAGE (OUTPATIENT)
Dept: ADMINISTRATIVE | Facility: OTHER | Age: 36
End: 2021-07-01

## 2021-09-22 NOTE — DISCHARGE INSTRUCTIONS
Follow-up with PCP in 2-3 days.  Return to ED for worsening of symptoms.   Bcc Superficial Histology Text: Superficial basaloid aggregates were seen at the basal layer of the epidermis demonstrating peripheral palisating and/or clefting.  The area of positive cancer is as marked on the Mohs map.

## 2024-02-06 ENCOUNTER — HOSPITAL ENCOUNTER (EMERGENCY)
Facility: HOSPITAL | Age: 39
Discharge: HOME | End: 2024-02-06
Attending: EMERGENCY MEDICINE
Payer: MEDICAID

## 2024-02-06 VITALS
RESPIRATION RATE: 18 BRPM | HEIGHT: 62 IN | SYSTOLIC BLOOD PRESSURE: 167 MMHG | WEIGHT: 225 LBS | DIASTOLIC BLOOD PRESSURE: 93 MMHG | OXYGEN SATURATION: 98 % | HEART RATE: 84 BPM | TEMPERATURE: 98 F | BODY MASS INDEX: 41.41 KG/M2

## 2024-02-06 DIAGNOSIS — R07.9 CHEST PAIN: ICD-10-CM

## 2024-02-06 DIAGNOSIS — I10 ELEVATED BLOOD PRESSURE READING WITH DIAGNOSIS OF HYPERTENSION: ICD-10-CM

## 2024-02-06 DIAGNOSIS — M54.9 BACK PAIN: Primary | ICD-10-CM

## 2024-02-06 DIAGNOSIS — N30.01 ACUTE CYSTITIS WITH HEMATURIA: ICD-10-CM

## 2024-02-06 LAB
ALBUMIN SERPL-MCNC: 4.2 G/DL (ref 3.3–5.5)
ALP SERPL-CCNC: 59 U/L (ref 42–141)
B-HCG UR QL: NEGATIVE
BILIRUB SERPL-MCNC: 0.7 MG/DL (ref 0.2–1.6)
BILIRUBIN, POC UA: ABNORMAL
BLOOD, POC UA: ABNORMAL
BUN SERPL-MCNC: 7 MG/DL (ref 7–22)
CALCIUM SERPL-MCNC: 10 MG/DL (ref 8–10.3)
CHLORIDE SERPL-SCNC: 104 MMOL/L (ref 98–108)
CLARITY, POC UA: ABNORMAL
COLOR, POC UA: ABNORMAL
CREAT SERPL-MCNC: 0.5 MG/DL (ref 0.6–1.2)
CTP QC/QA: YES
GLUCOSE SERPL-MCNC: 102 MG/DL (ref 73–118)
GLUCOSE, POC UA: NEGATIVE
HCT, POC: NORMAL
HGB, POC: NORMAL (ref 14–18)
KETONES, POC UA: ABNORMAL
LEUKOCYTE EST, POC UA: ABNORMAL
MCH, POC: NORMAL
MCHC, POC: NORMAL
MCV, POC: NORMAL
MPV, POC: NORMAL
NITRITE, POC UA: POSITIVE
PH UR STRIP: 5.5 [PH]
POC ALT (SGPT): 18 U/L (ref 10–47)
POC AST (SGOT): 24 U/L (ref 11–38)
POC CARDIAC TROPONIN I: 0 NG/ML (ref 0–0.08)
POC PLATELET COUNT: NORMAL
POC PTINR: 1.4 (ref 0.9–1.2)
POC PTWBT: 16.3 SEC (ref 9.7–14.3)
POC TCO2: 26 MMOL/L (ref 18–33)
POTASSIUM BLD-SCNC: 3.7 MMOL/L (ref 3.6–5.1)
PROTEIN, POC UA: ABNORMAL
PROTEIN, POC: 8.5 G/DL (ref 6.4–8.1)
RBC, POC: NORMAL
RDW, POC: NORMAL
SAMPLE: ABNORMAL
SAMPLE: NORMAL
SODIUM BLD-SCNC: 142 MMOL/L (ref 128–145)
SPECIFIC GRAVITY, POC UA: 1.02
UROBILINOGEN, POC UA: 1 E.U./DL
WBC, POC: NORMAL

## 2024-02-06 PROCEDURE — 96374 THER/PROPH/DIAG INJ IV PUSH: CPT | Mod: ER

## 2024-02-06 PROCEDURE — 63600175 PHARM REV CODE 636 W HCPCS: Mod: ER | Performed by: EMERGENCY MEDICINE

## 2024-02-06 PROCEDURE — 80053 COMPREHEN METABOLIC PANEL: CPT | Mod: ER

## 2024-02-06 PROCEDURE — 87086 URINE CULTURE/COLONY COUNT: CPT | Performed by: EMERGENCY MEDICINE

## 2024-02-06 PROCEDURE — 81003 URINALYSIS AUTO W/O SCOPE: CPT | Mod: ER

## 2024-02-06 PROCEDURE — 85025 COMPLETE CBC W/AUTO DIFF WBC: CPT | Mod: ER

## 2024-02-06 PROCEDURE — 93010 ELECTROCARDIOGRAM REPORT: CPT | Mod: ,,, | Performed by: INTERNAL MEDICINE

## 2024-02-06 PROCEDURE — 93005 ELECTROCARDIOGRAM TRACING: CPT | Mod: ER

## 2024-02-06 PROCEDURE — 81025 URINE PREGNANCY TEST: CPT | Mod: ER | Performed by: EMERGENCY MEDICINE

## 2024-02-06 PROCEDURE — 25000003 PHARM REV CODE 250: Mod: ER | Performed by: EMERGENCY MEDICINE

## 2024-02-06 PROCEDURE — 99285 EMERGENCY DEPT VISIT HI MDM: CPT | Mod: 25,ER

## 2024-02-06 PROCEDURE — 81025 URINE PREGNANCY TEST: CPT | Mod: ER

## 2024-02-06 PROCEDURE — 85610 PROTHROMBIN TIME: CPT | Mod: ER

## 2024-02-06 RX ORDER — MELOXICAM 15 MG/1
15 TABLET ORAL DAILY
Qty: 10 TABLET | Refills: 0 | Status: SHIPPED | OUTPATIENT
Start: 2024-02-06 | End: 2024-02-16

## 2024-02-06 RX ORDER — METHOCARBAMOL 750 MG/1
1500 TABLET, FILM COATED ORAL EVERY 6 HOURS
Qty: 24 TABLET | Refills: 0 | Status: SHIPPED | OUTPATIENT
Start: 2024-02-06 | End: 2024-02-09

## 2024-02-06 RX ORDER — KETOROLAC TROMETHAMINE 30 MG/ML
15 INJECTION, SOLUTION INTRAMUSCULAR; INTRAVENOUS
Status: DISCONTINUED | OUTPATIENT
Start: 2024-02-06 | End: 2024-02-06

## 2024-02-06 RX ORDER — METHOCARBAMOL 500 MG/1
1000 TABLET, FILM COATED ORAL
Status: COMPLETED | OUTPATIENT
Start: 2024-02-06 | End: 2024-02-06

## 2024-02-06 RX ORDER — ACETAMINOPHEN 325 MG/1
650 TABLET ORAL
Status: COMPLETED | OUTPATIENT
Start: 2024-02-06 | End: 2024-02-06

## 2024-02-06 RX ORDER — CIPROFLOXACIN 500 MG/1
500 TABLET ORAL 2 TIMES DAILY
Qty: 20 TABLET | Refills: 0 | Status: SHIPPED | OUTPATIENT
Start: 2024-02-06 | End: 2024-02-16

## 2024-02-06 RX ORDER — DEXAMETHASONE SODIUM PHOSPHATE 4 MG/ML
8 INJECTION, SOLUTION INTRA-ARTICULAR; INTRALESIONAL; INTRAMUSCULAR; INTRAVENOUS; SOFT TISSUE
Status: COMPLETED | OUTPATIENT
Start: 2024-02-06 | End: 2024-02-06

## 2024-02-06 RX ADMIN — DEXAMETHASONE SODIUM PHOSPHATE 8 MG: 4 INJECTION INTRA-ARTICULAR; INTRALESIONAL; INTRAMUSCULAR; INTRAVENOUS; SOFT TISSUE at 06:02

## 2024-02-06 RX ADMIN — METHOCARBAMOL 1000 MG: 500 TABLET ORAL at 06:02

## 2024-02-06 RX ADMIN — ACETAMINOPHEN 650 MG: 325 TABLET ORAL at 06:02

## 2024-02-06 NOTE — Clinical Note
"Janae Philippera" Jonny was seen and treated in our emergency department on 2/6/2024.  She may return to work on 02/12/2024.       If you have any questions or concerns, please don't hesitate to call.      LILLIAN Whatley RN    "
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
## 2024-02-07 ENCOUNTER — HOSPITAL ENCOUNTER (EMERGENCY)
Facility: HOSPITAL | Age: 39
Discharge: HOME | End: 2024-02-07
Attending: EMERGENCY MEDICINE
Payer: MEDICAID

## 2024-02-07 VITALS
RESPIRATION RATE: 19 BRPM | SYSTOLIC BLOOD PRESSURE: 132 MMHG | OXYGEN SATURATION: 100 % | WEIGHT: 222 LBS | HEART RATE: 84 BPM | BODY MASS INDEX: 40.6 KG/M2 | DIASTOLIC BLOOD PRESSURE: 92 MMHG | TEMPERATURE: 99 F

## 2024-02-07 DIAGNOSIS — N92.0 MENORRHAGIA WITH REGULAR CYCLE: Primary | ICD-10-CM

## 2024-02-07 DIAGNOSIS — E61.1 IRON DEFICIENCY: ICD-10-CM

## 2024-02-07 LAB
ABO + RH BLD: NORMAL
ALBUMIN SERPL BCP-MCNC: 4 G/DL (ref 3.5–5.2)
ALP SERPL-CCNC: 51 U/L (ref 55–135)
ALT SERPL W/O P-5'-P-CCNC: 16 U/L (ref 10–44)
ANION GAP SERPL CALC-SCNC: 9 MMOL/L (ref 8–16)
APTT PPP: 25.8 SEC (ref 21–32)
AST SERPL-CCNC: 16 U/L (ref 10–40)
BASOPHILS # BLD AUTO: 0.01 K/UL (ref 0–0.2)
BASOPHILS NFR BLD: 0.1 % (ref 0–1.9)
BILIRUB SERPL-MCNC: 0.3 MG/DL (ref 0.1–1)
BLD GP AB SCN CELLS X3 SERPL QL: NORMAL
BUN SERPL-MCNC: 7 MG/DL (ref 6–20)
CALCIUM SERPL-MCNC: 9.6 MG/DL (ref 8.7–10.5)
CHLORIDE SERPL-SCNC: 105 MMOL/L (ref 95–110)
CO2 SERPL-SCNC: 23 MMOL/L (ref 23–29)
CREAT SERPL-MCNC: 0.8 MG/DL (ref 0.5–1.4)
DIFFERENTIAL METHOD BLD: ABNORMAL
EOSINOPHIL # BLD AUTO: 0 K/UL (ref 0–0.5)
EOSINOPHIL NFR BLD: 0 % (ref 0–8)
ERYTHROCYTE [DISTWIDTH] IN BLOOD BY AUTOMATED COUNT: 20 % (ref 11.5–14.5)
EST. GFR  (NO RACE VARIABLE): >60 ML/MIN/1.73 M^2
GLUCOSE SERPL-MCNC: 119 MG/DL (ref 70–110)
HCT VFR BLD AUTO: 30.2 % (ref 37–48.5)
HGB BLD-MCNC: 9.5 G/DL (ref 12–16)
IMM GRANULOCYTES # BLD AUTO: 0.08 K/UL (ref 0–0.04)
IMM GRANULOCYTES NFR BLD AUTO: 0.6 % (ref 0–0.5)
INR PPP: 1 (ref 0.8–1.2)
IRON SERPL-MCNC: 57 UG/DL (ref 30–160)
LYMPHOCYTES # BLD AUTO: 1.2 K/UL (ref 1–4.8)
LYMPHOCYTES NFR BLD: 8.8 % (ref 18–48)
MCH RBC QN AUTO: 25.1 PG (ref 27–31)
MCHC RBC AUTO-ENTMCNC: 31.5 G/DL (ref 32–36)
MCV RBC AUTO: 80 FL (ref 82–98)
MONOCYTES # BLD AUTO: 0.8 K/UL (ref 0.3–1)
MONOCYTES NFR BLD: 5.7 % (ref 4–15)
NEUTROPHILS # BLD AUTO: 11.7 K/UL (ref 1.8–7.7)
NEUTROPHILS NFR BLD: 84.8 % (ref 38–73)
NRBC BLD-RTO: 0 /100 WBC
OHS QRS DURATION: 74 MS
OHS QTC CALCULATION: 442 MS
PLATELET # BLD AUTO: 420 K/UL (ref 150–450)
PMV BLD AUTO: 9.5 FL (ref 9.2–12.9)
POTASSIUM SERPL-SCNC: 3.8 MMOL/L (ref 3.5–5.1)
PROT SERPL-MCNC: 8.4 G/DL (ref 6–8.4)
PROTHROMBIN TIME: 11.3 SEC (ref 9–12.5)
RBC # BLD AUTO: 3.79 M/UL (ref 4–5.4)
SATURATED IRON: 10 % (ref 20–50)
SODIUM SERPL-SCNC: 137 MMOL/L (ref 136–145)
SPECIMEN OUTDATE: NORMAL
TOTAL IRON BINDING CAPACITY: 574 UG/DL (ref 250–450)
TRANSFERRIN SERPL-MCNC: 388 MG/DL (ref 200–375)
WBC # BLD AUTO: 13.78 K/UL (ref 3.9–12.7)

## 2024-02-07 PROCEDURE — 80053 COMPREHEN METABOLIC PANEL: CPT

## 2024-02-07 PROCEDURE — 85025 COMPLETE CBC W/AUTO DIFF WBC: CPT

## 2024-02-07 PROCEDURE — 86850 RBC ANTIBODY SCREEN: CPT

## 2024-02-07 PROCEDURE — 99284 EMERGENCY DEPT VISIT MOD MDM: CPT | Mod: 25

## 2024-02-07 PROCEDURE — 83540 ASSAY OF IRON: CPT

## 2024-02-07 PROCEDURE — 85610 PROTHROMBIN TIME: CPT

## 2024-02-07 PROCEDURE — 85730 THROMBOPLASTIN TIME PARTIAL: CPT

## 2024-02-07 NOTE — ED NOTES
Report called over from provider that patient hemoglobin yesterday was over 10 and today was 7.7.

## 2024-02-07 NOTE — ED PROVIDER NOTES
Care turned over from Dr Jaramillo pending imaging results, labs, reassessment and final disposition  Ryan Cantu MD, Emergency Medicine Staff 6:09 PM 2/6/2024        Chart Review     Latest Reference Range & Units Most Recent 03/12/21 05:09 03/13/21 02:55   Hemoglobin 12.0 - 16.0 g/dL 9.2 (L)  3/13/21 02:55 9.0 (L) 9.2 (L)   Hematocrit 37.0 - 48.5 % 29.3 (L)  3/13/21 02:55 28.7 (L) 29.3 (L)   (L): Data is abnormally low  Labs Reviewed  H&H chronically decreased 10.9 and 32.4 improved compared to most recent historical values.  INR 1.4, total protein 8.5, 2+ bilirubin urea trace ketonuria 3+ hematuria 3+ proteinuria nitrite positive 3+ leukocytes x-ray thoracic spine negative for acute abnormality x-ray lumbar spine negative for acute abnormality x-ray cervical spine negative for acute abnormality chest x-ray negative for acute abnormality    Admission on 02/06/2024, Discharged on 02/06/2024   Component Date Value Ref Range Status    POC Preg Test, Ur 02/06/2024 Negative  Negative Final     Acceptable 02/06/2024 Yes   Final    QRS Duration 02/06/2024 74  ms Final    [facility] QTC Calculation 02/06/2024 442  ms Final    POC PTWBT 02/06/2024 16.3 (H)  9.7 - 14.3 sec Final    POC PTINR 02/06/2024 1.4 (H)  0.9 - 1.2 Final    Sample 02/06/2024 unknown   Final    Albumin, POC 02/06/2024 4.2  3.3 - 5.5 g/dL Final    Alkaline Phosphatase, POC 02/06/2024 59  42 - 141 U/L Final    ALT (SGPT), POC 02/06/2024 18  10 - 47 U/L Final    AST (SGOT), POC 02/06/2024 24  11 - 38 U/L Final    POC BUN 02/06/2024 7  7 - 22 mg/dL Final    Calcium, POC 02/06/2024 10.0  8.0 - 10.3 mg/dL Final    POC Chloride 02/06/2024 104  98 - 108 mmol/L Final    POC Creatinine 02/06/2024 0.5 (L)  0.6 - 1.2 mg/dL Final    POC Glucose 02/06/2024 102  73 - 118 mg/dL Final    POC Potassium 02/06/2024 3.7  3.6 - 5.1 mmol/L Final    POC Sodium 02/06/2024 142  128 - 145 mmol/L Final    Bilirubin, POC 02/06/2024 0.7  0.2 - 1.6 mg/dL Final    POC  TCO2 02/06/2024 26  18 - 33 mmol/L Final    Protein, POC 02/06/2024 8.5 (H)  6.4 - 8.1 g/dL Final    POC Cardiac Troponin I 02/06/2024 0.00  0.00 - 0.08 ng/mL Final    Sample 02/06/2024 unknown   Final    Comment: A single negative troponin is insufficient to rule out myocardial infarction.  The use of a serial sampling protocol is recommended practice. Correlate results with reference intervals established for methodology used. Point of care and core laboratory   troponin results are not interchangeable.      Glucose, UA 02/06/2024 Negative   Final    Bilirubin, UA 02/06/2024 2+ (A)   Final    Ketones, UA 02/06/2024 Trace (A)   Final    Spec Grav UA 02/06/2024 1.020   Final    Blood, UA 02/06/2024 3+ (A)   Final    PH, UA 02/06/2024 5.5   Final    Protein, UA 02/06/2024 3+ (A)   Final    Urobilinogen, UA 02/06/2024 1.0  E.U./dL Final    Nitrite, UA 02/06/2024 Positive (P)   Final    Leukocytes, UA 02/06/2024 3+ (A)   Final    Color, UA 02/06/2024 Red   Final    Clarity, UA 02/06/2024 Turbid   Final    Urine Culture, Routine 02/06/2024 No significant growth   Final        Imaging Reviewed    Imaging Results              X-Ray Thoracic Spine AP Lateral (Final result)  Result time 02/06/24 19:33:01      Final result by Alexei Gutierrez MD (02/06/24 19:33:01)                   Impression:      No acute thoracic spine abnormalities identified.      Electronically signed by: Alexei Gutierrez MD  Date:    02/06/2024  Time:    19:33               Narrative:    EXAMINATION:  XR THORACIC SPINE AP LATERAL    CLINICAL HISTORY:  Dorsalgia, unspecified    TECHNIQUE:  AP and lateral views of the thoracic spine were performed.    COMPARISON:  None    FINDINGS:  Thoracic spine alignment appears within normal limits.  No evidence of acute thoracic spine fracture or subluxation.  Minor degenerative changes are seen within the thoracic spine.  Heart is normal in size.  Visualized lungs are clear.                                        X-Ray Lumbar Spine Ap And Lateral (Final result)  Result time 02/06/24 19:34:54      Final result by Alexei Gutierrez MD (02/06/24 19:34:54)                   Impression:      No acute lumbar spine abnormalities identified.      Electronically signed by: Alexei Gutierrez MD  Date:    02/06/2024  Time:    19:34               Narrative:    EXAMINATION:  XR LUMBAR SPINE AP AND LATERAL    CLINICAL HISTORY:  Back pain;    TECHNIQUE:  AP, lateral and spot images were performed of the lumbar spine.    COMPARISON:  None    FINDINGS:  Lumbar spine alignment is within normal limits.  No evidence of acute lumbar spine fracture or subluxation.  Lumbar intervertebral disc spaces appear fairly well maintained.  Visualized sacrum is unremarkable.  Partially visualized ORIF changes and hardware are seen involving the right aspect of the pelvis.                                       X-Ray Cervical Spine 2 or 3 Views (Final result)  Result time 02/06/24 19:32:02      Final result by Alexei Gutierrez MD (02/06/24 19:32:02)                   Impression:      No acute cervical spine abnormalities identified.      Electronically signed by: Alexei Gutierrez MD  Date:    02/06/2024  Time:    19:32               Narrative:    EXAMINATION:  XR CERVICAL SPINE 2 OR 3 VIEWS    CLINICAL HISTORY:  Back pain;    TECHNIQUE:  AP, lateral and open mouth views of the cervical spine were performed.    COMPARISON:  None.    FINDINGS:  No evidence of acute cervical spine fracture or subluxation.  There is straightening and mild reversal of the normal cervical lordosis.  Odontoid process appears intact.  Minor degenerative spurring is seen in the lower cervical spine.  Surrounding soft tissues show no significant abnormalities.                                       X-Ray Chest PA And Lateral (Final result)  Result time 02/06/24 19:30:47      Final result by Alexei Gutierrez MD (02/06/24 19:30:47)                   Impression:      No acute  cardiopulmonary process identified.      Electronically signed by: Alexei Gutierrez MD  Date:    02/06/2024  Time:    19:30               Narrative:    EXAMINATION:  XR CHEST PA AND LATERAL    CLINICAL HISTORY:  Chest Pain;    TECHNIQUE:  PA and lateral views of the chest were performed.    COMPARISON:  March 2021.    FINDINGS:  Cardiac silhouette is normal in size.  Lungs are symmetrically expanded.  No evidence of focal consolidative process, pneumothorax, or significant pleural effusion.  No acute osseous abnormality identified.                                      Medications given in ED    Medications   methocarbamoL tablet 1,000 mg (1,000 mg Oral Given 2/6/24 1848)   dexAMETHasone injection 8 mg (8 mg Intravenous Given 2/6/24 1848)   acetaminophen tablet 650 mg (650 mg Oral Given 2/6/24 1848)       Note was created using voice recognition software. Note may have occasional typographical errors that may not have been identified and edited despite good allie initial review prior to signing.    Test results, imaging results, outpatient management plan, outpatient PCP follow up and ED return precautions discussed with patient with understanding and agreement.        Ryan Cantu MD  02/15/24 [id number]

## 2024-02-07 NOTE — ED PROVIDER NOTES
"Encounter Date: 2/7/2024    SCRIBE #1 NOTE: I, Ashely Duff, am scribing for, and in the presence of,  Callie Rodriguez PA-C. I have scribed the following portions of the note - Other sections scribed: HPI, ROS, PE.       History     Chief Complaint   Patient presents with    abnormal labs     Pt presents to the ED per per PCP request to be evaluated to abnormal lab values   Report called over from provider that patient hemoglobin yesterday was over 10 and today was 7.7.   Pt denies any symptoms  Pt [id number]     This 38 y.o female, with a medical history of Hypertension, presents to the ED for evaluation of abnormal labs. Patient states she was sent here by PCP  for hemoglobin of 7 received from labs taken this morning. Patient reports she was initially seen at [facility] ED yesterday for numbness and tingling to her extremities but workup was unremarkable and advised to follow up with PCP. Pt reports she is currently on her menstrual cycle and notes that it is normally heavy for the first 3 days and she is on day 3. She states that she typically completely saturates about 9 sanitary pads per day, changing pads every 2 hours during her cycle. She notes, however, that this is the first time she has experienced numbness/ tingling while on her cycle which prompted her to ED. The numbness is presently persisting and has been causing her to "walk funny." It is exacerbated with quick movements. No recent falls, trauma or syncopal episodes. Ambulating without difficulty.  Patient denies fever/chills, headache, chest pain, shortness of breath, abdominal pain, nausea/vomiting/diarrhea, hematemesis, blood in stool, hematuria, urinary concerns, neck stiffness, or any other complaints at this time. She was advised to take iron supplements in the past but stopped because she was unsure if she could take then with her other medications. She does report hx of uterine fibroids but has not reviewed options for persistent heavy " periods.    The history is provided by the patient.     Review of patient's allergies indicates:   Allergen Reactions    Codeine Other (See Comments) and Hives     Reports ruslankes     Past Medical History:   Diagnosis Date    COVID-19 03/04/2021    Hypertension      Past Surgical History:   Procedure Laterality Date    JOINT REPLACEMENT Right 2003    Hip replacement     History reviewed. No pertinent family history.  Social History     Tobacco Use    Smoking status: Never    Smokeless tobacco: Never   Substance Use Topics    Alcohol use: Not Currently    Drug use: Never     Review of Systems   Constitutional:  Negative for chills and fever.        (+) abnormal labs (hemoglobin of 7)   HENT:  Negative for congestion, ear pain, nosebleeds, rhinorrhea and sore throat.    Respiratory:  Negative for shortness of breath.    Cardiovascular:  Negative for chest pain.   Gastrointestinal:  Negative for abdominal pain, anal bleeding, blood in stool, constipation, diarrhea, nausea, rectal pain and vomiting.   Genitourinary:  Positive for menstrual problem (heavy menstrual cycle) and vaginal bleeding. Negative for decreased urine volume, dysuria, frequency and urgency.   Musculoskeletal:  Negative for back pain and myalgias.   Skin:  Negative for pallor and rash.   Neurological:  Positive for numbness (radiating from the abdomen down into the bilateral legs). Negative for dizziness, syncope, speech difficulty, weakness, light-headedness and headaches.       Physical Exam     Initial Vitals [02/07/24 1116]   BP Pulse Resp Temp SpO2   (!) 132/92 (!) 116 20 98.5 °F (36.9 °C) 100 %      MAP       --         Physical Exam    Nursing note and vitals reviewed.  Constitutional: She appears well-developed and well-nourished. She is not diaphoretic. No distress.   HENT:   Head: Normocephalic and atraumatic.   Right Ear: External ear normal.   Left Ear: External ear normal.   Mouth/Throat: Oropharynx is clear and moist.   Eyes: Conjunctivae  and EOM are normal. Right eye exhibits no discharge. Left eye exhibits no discharge. No scleral icterus.   Neck: Neck supple. No tracheal deviation present.   Normal range of motion.  Cardiovascular:  Normal rate, regular rhythm and normal heart sounds.           Pulmonary/Chest: Breath sounds normal. No respiratory distress. She has no wheezes.   Abdominal: Abdomen is soft. She exhibits no distension. There is no abdominal tenderness.   Abdomen is nondistended, soft and nontender in all quadrants. Normoactive bowel sounds. Nonperitoneal, no guarding or rigidity. No palpable masses or hepatosplenomegaly.  No suprapubic pain. No CVAT.    No overlying skin changes.  There is no rebound.   Genitourinary:    Vagina normal.      Genitourinary Comments: *MAYE Simmons chaperoned and in plain view during entire exam.    Minimal blood noted in vaginal vault. No signs of hemorrhage.   Normally developed external female genitalia with no external lesions or eruptions. Vagina and cervix have no lesions, inflammation, discharge, or tenderness. Cervix is nontender and os closed. No adnexal fullness.       Musculoskeletal:         General: Normal range of motion.      Cervical back: Normal range of motion and neck supple.      Comments: 5/5 strength in all extremities with no decrease in sensation.  Patient has full range of motion of all extremities.  2+ radial pulses.  2+ dorsalis pedis pulse.  2+ posterior tibial pulse.  Ambulating without difficulty.      Neurological: She is alert and oriented to person, place, and time. She has normal strength. GCS score is 15.   Skin: Skin is warm and dry. Capillary refill takes less than 2 seconds. No rash noted.   Psychiatric: She has a normal mood and affect. Thought content normal.         ED Course   Procedures  Labs Reviewed   CBC W/ AUTO DIFFERENTIAL - Abnormal; Notable for the following components:       Result Value    WBC 13.78 (*)     RBC 3.79 (*)     Hemoglobin 9.5 (*)      Hematocrit 30.2 (*)     MCV 80 (*)     MCH 25.1 (*)     MCHC 31.5 (*)     RDW 20.0 (*)     Immature Granulocytes 0.6 (*)     Gran # (ANC) 11.7 (*)     Immature Grans (Abs) 0.08 (*)     Gran % 84.8 (*)     Lymph % 8.8 (*)     All other components within normal limits   COMPREHENSIVE METABOLIC PANEL - Abnormal; Notable for the following components:    Glucose 119 (*)     Alkaline Phosphatase 51 (*)     All other components within normal limits   IRON AND TIBC - Abnormal; Notable for the following components:    Transferrin 388 (*)     TIBC 574 (*)     Saturated Iron 10 (*)     All other components within normal limits   PROTIME-INR   APTT   TYPE & SCREEN          Imaging Results    None          Medications - No data to display  Medical Decision Making  This is an emergent evaluation of a 38 y.o. female presenting to the ED for concern of anemia. She reports 3 days of heavy vaginal bleeding and hemoglobin of 7 during todays labs from PCP. PCP Dr. Uribe is not within the [facility] and externa labs can not be viewed.  Denies abdominal pain and fever. Patient is non-toxic appearing and in no acute distress. Benign abdominal exam. Pelvic exam with no indication of hemorrhage. Well hydrated with moist mucus membranes. Breath sounds are clear and equal bilaterally with no adventitious breath sounds, tachypnea or respiratory distress. Regular rate and rhythm. Strength and sensation intact bilateral upper and lower extremities. Ambulating without difficulty. Physical exam otherwise as above.      CBC with mild leukocytosis.  Likely related to current UTI and prescribed antibiotics. Hemoglobin and hematocrit stable. Iron deficiency noted. No indication for emergent transfusion. CMP unremarkable without significant electrolyte derangement, impaired renal function, or elevated LFTs. Bleeding times wnl.     Explained that blood is not indicated but advised daily iron supplement. I discussed with the patient the  diagnosis, treatment plan, indications for return to the emergency department, and for expected follow-up. Specifically advised patient to follow up with OBGYN for evaluation of heavy menstrual cycles.The patient verbalized an understanding. The patient is asked if there are any questions or concerns. We discuss the case, until all issues are addressed to the patient's satisfaction. Patient understands and is agreeable to the plan. Strict return precautions given. Patient stable for discharge and has no complaints at this time.     Amount and/or Complexity of Data Reviewed  External Data Reviewed: labs and notes.  Labs: ordered.  Discussion of management or test interpretation with external provider(s): I discussed this patient with Dr. Mayer who is agrees to my assessment and plan.     Risk  OTC drugs.            Scribe Attestation:   Scribe #1: I performed the above scribed service and the documentation accurately describes the services I performed. I attest to the accuracy of the note.        ED Course as of 02/11/24 2221 Wed Feb 07, 2024   1505 Pelvic exam completed.  Unremarkable no signs of uterine hemorrhage. [CC]      ED Course User Index  [CC] Callie Rodriguez PA-C I, C. Caballero, Emergency Medicine PANICOLE , personally performed the services described in this documentation. All medical record entries made by the scribe were at my direction and in my presence. I have reviewed the chart and agree that the record reflects my personal performance and is accurate and complete.       Clinical Impression:  Final diagnoses:  [N92.0] Menorrhagia with regular cycle (Primary)  [E61.1] Iron deficiency          ED Disposition Condition    Discharge Stable          ED Prescriptions    None       Follow-up Information       Follow up With Specialties Details Why Contact Info    [facility] - Emergency Dept Emergency Medicine Go to  For new or worsening symptoms [facility] [address] [facility]  [facility], [facility] [id number] [phone]    Ban Mckeon, NP Family Medicine   [address] [facility] [place] [address] [phone]               Callie Rodriguez PA-C  02/11/24 2220     Cryotherapy Text: The wound bed was treated with cryotherapy after the biopsy was performed.

## 2024-02-07 NOTE — DISCHARGE INSTRUCTIONS
Rest and drink plenty of fluids.  Follow up with Gynecology.  Referral placed.  Follow up with primary care in 1 week for repeat labs to make sure blood count stabilized.    Thank you for coming to our Emergency Department today. It is important to remember that some problems or medical conditions are difficult to diagnose and may not be found or addressed during your Emergency Department visit.  These conditions often start with non-specific symptoms and can only be diagnosed on follow up visits with your primary care physician or specialist when the symptoms continue or change. Please remember that all medical conditions can change, and we cannot predict how you will be feeling tomorrow or the next day. Return to the ER with any questions/concerns, new/concerning symptoms, worsening or failure to improve.     Please return to ER if you experience severe dizziness, fever higher then 100.4 that persist after medication administration, uncontrolled nausea/vomiting or diarrhea or any other major concern like increased pain, chest pain, shortness of breath, inability to pass stool or gas, or difficulty breathing or swelling of the throat/mouth/tongue.    Be sure to follow up with your primary care doctor and review all labs/imaging/tests that were performed during your ER visit with them. It is very common for us to identify non-emergent incidental findings which must be followed up with your primary care physician.  Some labs/imaging/tests may be outside of the normal range, and require non-emergent follow-up and/or further investigation/treatment/procedures/testing to help diagnose/exclude/prevent complications or other potentially serious medical conditions. Some abnormalities may not have been discussed or addressed during your ER visit.     An ER visit does not replace a primary care visit, and many screening tests or follow-up tests cannot be ordered by an ER doctor or performed by the ER. Some tests may even  require pre-approval.    If you do not have a primary care doctor, you may contact the one listed on your discharge paperwork or you may also call the [facility] Appointment Desk at [phone] , or [address] [place] [address] at  [phone] to schedule an appointment, or establish care with a primary care doctor or even a specialist and to obtain information about local resources. It is important to your health that you have a primary care doctor.    Please take all medications as directed. We have done our best to select a medication for you that will treat your condition however, all medications may potentially have side-effects and it is impossible to predict which medications may give you side-effects or what those side-effects (if any) those medications may give you.  If you feel that you are having a negative effect or side-effect of any medication you should stop taking those medications immediately and seek medical attention. If you feel that you are having a life-threatening reaction call 911.      Do not drive, swim, climb to height, take a bath, operate heavy machinery, drink alcohol or take potentially sedating medications, sign any legal documents or make any important decisions for 24 hours if you have received any pain medications, sedatives or mood altering drugs during your ER visit or within 24 hours of taking them if they have been prescribed to you.     You can find additional resources for Dentists, hearing aids, durable medical equipment, low cost pharmacies and other resources at [email or web address]

## 2024-02-09 LAB — BACTERIA UR CULT: NORMAL

## 2024-03-06 ENCOUNTER — HOSPITAL ENCOUNTER (INPATIENT)
Facility: HOSPITAL | Age: 39
LOS: 5 days | Discharge: HOME | DRG: 472 | End: 2024-03-11
Attending: EMERGENCY MEDICINE | Admitting: NEUROLOGICAL SURGERY
Payer: MEDICAID

## 2024-03-06 DIAGNOSIS — M48.02 CERVICAL STENOSIS OF SPINAL CANAL: Primary | ICD-10-CM

## 2024-03-06 DIAGNOSIS — G95.9 CERVICAL MYELOPATHY: ICD-10-CM

## 2024-03-06 DIAGNOSIS — M50.20 CERVICAL HERNIATED DISC: ICD-10-CM

## 2024-03-06 DIAGNOSIS — M54.2 NECK PAIN: ICD-10-CM

## 2024-03-06 DIAGNOSIS — Z98.1 S/P CERVICAL SPINAL FUSION: ICD-10-CM

## 2024-03-06 LAB
ALBUMIN SERPL BCP-MCNC: 3.9 G/DL (ref 3.5–5.2)
ALP SERPL-CCNC: 52 U/L (ref 55–135)
ALT SERPL W/O P-5'-P-CCNC: 14 U/L (ref 10–44)
ANION GAP SERPL CALC-SCNC: 7 MMOL/L (ref 8–16)
AST SERPL-CCNC: 19 U/L (ref 10–40)
B-HCG UR QL: NEGATIVE
BASOPHILS # BLD AUTO: 0.03 K/UL (ref 0–0.2)
BASOPHILS NFR BLD: 0.4 % (ref 0–1.9)
BILIRUB SERPL-MCNC: 0.2 MG/DL (ref 0.1–1)
BUN SERPL-MCNC: 8 MG/DL (ref 6–20)
CALCIUM SERPL-MCNC: 9.8 MG/DL (ref 8.7–10.5)
CHLORIDE SERPL-SCNC: 106 MMOL/L (ref 95–110)
CO2 SERPL-SCNC: 24 MMOL/L (ref 23–29)
CREAT SERPL-MCNC: 0.9 MG/DL (ref 0.5–1.4)
CTP QC/QA: YES
DIFFERENTIAL METHOD BLD: ABNORMAL
EOSINOPHIL # BLD AUTO: 0.1 K/UL (ref 0–0.5)
EOSINOPHIL NFR BLD: 1.4 % (ref 0–8)
ERYTHROCYTE [DISTWIDTH] IN BLOOD BY AUTOMATED COUNT: 18.1 % (ref 11.5–14.5)
EST. GFR  (NO RACE VARIABLE): >60 ML/MIN/1.73 M^2
GLUCOSE SERPL-MCNC: 96 MG/DL (ref 70–110)
HCT VFR BLD AUTO: 31.1 % (ref 37–48.5)
HCV AB SERPL QL IA: NORMAL
HGB BLD-MCNC: 10.1 G/DL (ref 12–16)
HIV 1+2 AB+HIV1 P24 AG SERPL QL IA: NORMAL
IMM GRANULOCYTES # BLD AUTO: 0.02 K/UL (ref 0–0.04)
IMM GRANULOCYTES NFR BLD AUTO: 0.3 % (ref 0–0.5)
LYMPHOCYTES # BLD AUTO: 2.8 K/UL (ref 1–4.8)
LYMPHOCYTES NFR BLD: 38.9 % (ref 18–48)
MCH RBC QN AUTO: 26.6 PG (ref 27–31)
MCHC RBC AUTO-ENTMCNC: 32.5 G/DL (ref 32–36)
MCV RBC AUTO: 82 FL (ref 82–98)
MONOCYTES # BLD AUTO: 0.6 K/UL (ref 0.3–1)
MONOCYTES NFR BLD: 8.2 % (ref 4–15)
NEUTROPHILS # BLD AUTO: 3.6 K/UL (ref 1.8–7.7)
NEUTROPHILS NFR BLD: 50.8 % (ref 38–73)
NRBC BLD-RTO: 0 /100 WBC
PLATELET # BLD AUTO: 418 K/UL (ref 150–450)
PMV BLD AUTO: 9.9 FL (ref 9.2–12.9)
POTASSIUM SERPL-SCNC: 4 MMOL/L (ref 3.5–5.1)
PROT SERPL-MCNC: 8.2 G/DL (ref 6–8.4)
RBC # BLD AUTO: 3.8 M/UL (ref 4–5.4)
SODIUM SERPL-SCNC: 137 MMOL/L (ref 136–145)
WBC # BLD AUTO: 7.09 K/UL (ref 3.9–12.7)

## 2024-03-06 PROCEDURE — 85025 COMPLETE CBC W/AUTO DIFF WBC: CPT | Performed by: EMERGENCY MEDICINE

## 2024-03-06 PROCEDURE — 80053 COMPREHEN METABOLIC PANEL: CPT | Performed by: EMERGENCY MEDICINE

## 2024-03-06 PROCEDURE — 81025 URINE PREGNANCY TEST: CPT | Performed by: EMERGENCY MEDICINE

## 2024-03-06 PROCEDURE — 12000002 HC ACUTE/MED SURGE SEMI-PRIVATE ROOM

## 2024-03-06 PROCEDURE — 99223 1ST HOSP IP/OBS HIGH 75: CPT | Mod: ,,, | Performed by: STUDENT IN AN ORGANIZED HEALTH CARE EDUCATION/TRAINING PROGRAM

## 2024-03-06 PROCEDURE — 87389 HIV-1 AG W/HIV-1&-2 AB AG IA: CPT | Performed by: PHYSICIAN ASSISTANT

## 2024-03-06 PROCEDURE — 86803 HEPATITIS C AB TEST: CPT | Performed by: PHYSICIAN ASSISTANT

## 2024-03-06 PROCEDURE — 99285 EMERGENCY DEPT VISIT HI MDM: CPT | Mod: 25

## 2024-03-06 NOTE — FIRST PROVIDER EVALUATION
Medical screening examination initiated.  I have conducted a focused provider triage encounter, findings are as follows:    Brief history of present illness:  referred to ER to have back surgery for a slipped disk in her back.  Has CD of MRI.    There were no vitals filed for this visit.    Pertinent physical exam:  ambulatory    Brief workup plan:  intake    Preliminary workup initiated; this workup will be continued and followed by the physician or advanced practice provider that is assigned to the patient when roomed.

## 2024-03-06 NOTE — ED PROVIDER NOTES
Encounter Date: 3/6/2024       History     Chief Complaint   Patient presents with    Back Pain     Says she was sent er for back surgery for a slipped disc; had cd of mri      38-year-old female, history of hypertension, referred to the ED by her PCP given concerns for a herniated disc in her neck.  Patient states that for the for the past month she has had paresthesias in her entire body from about her breast down.  She reports paresthesias in her upper and lower extremities.  She reports that she has had difficulty using her hands.  She is also intermittently had difficulty walking secondary to weakness.  She reports intermittent urinary incontinence.  She went for an MRI today and the radiologist called her ordering physician and told her that she needed to go to the ER immediately because of a herniated disc in her neck.    The history is provided by the patient.     Review of patient's allergies indicates:   Allergen Reactions    Codeine Other (See Comments) and Hives     Reports shakes     Past Medical History:   Diagnosis Date    COVID-19 03/04/2021    Hypertension      Past Surgical History:   Procedure Laterality Date    JOINT REPLACEMENT Right 2003    Hip replacement     History reviewed. No pertinent family history.  Social History     Tobacco Use    Smoking status: Never    Smokeless tobacco: Never   Substance Use Topics    Alcohol use: Not Currently    Drug use: Never     Review of Systems    Physical Exam     Initial Vitals   BP Pulse Resp Temp SpO2   03/06/24 1317 03/06/24 1316 03/06/24 1316 03/06/24 1316 03/06/24 1316   (!) 154/74 78 17 98.3 °F (36.8 °C) 99 %      MAP       --                Physical Exam    Nursing note and vitals reviewed.  Constitutional: Vital signs are normal. She appears well-developed and well-nourished. She is not diaphoretic.  Non-toxic appearance. She does not appear ill. No distress.   HENT:   Head: Normocephalic and atraumatic.   Mouth/Throat: Mucous membranes are  normal. Mucous membranes are not dry.   Eyes: Conjunctivae and lids are normal.   Neck: Neck supple.   Normal range of motion.  Cardiovascular:  Normal rate.           Musculoskeletal:      Cervical back: Normal range of motion and neck supple.     Neurological: She is alert and oriented to person, place, and time. She has normal strength. She displays no atrophy and no tremor. A sensory deficit is present. No cranial nerve deficit. She exhibits normal muscle tone. Coordination normal. GCS score is 15. GCS eye subscore is 4. GCS verbal subscore is 5. GCS motor subscore is 6.   Skin: Skin is dry and intact. No pallor.   Psychiatric: She has a normal mood and affect. Her speech is normal and behavior is normal.         ED Course   Procedures  Labs Reviewed   CBC W/ AUTO DIFFERENTIAL - Abnormal; Notable for the following components:       Result Value    RBC 3.80 (*)     Hemoglobin 10.1 (*)     Hematocrit 31.1 (*)     MCH 26.6 (*)     RDW 18.1 (*)     All other components within normal limits   COMPREHENSIVE METABOLIC PANEL - Abnormal; Notable for the following components:    Alkaline Phosphatase 52 (*)     Anion Gap 7 (*)     All other components within normal limits   HIV 1 / 2 ANTIBODY    Narrative:     Release to patient->Immediate   HEPATITIS C ANTIBODY    Narrative:     Release to patient->Immediate   APTT   PROTIME-INR   POCT URINE PREGNANCY   TYPE & SCREEN          Imaging Results               MRI Cervical Spine Without Contrast (Final result)  Result time 03/06/24 22:53:22      Final result by Denys Rader MD (03/06/24 22:53:22)                   Impression:      Cervical spine spondylosis most severe at C6-C7 with disc extrusion resulting in severe spinal canal stenosis and bilateral severe neural foraminal narrowing.  Subtle increased STIR signal intensity at this level suggestive of compressive myelopathy.  Recommend spine surgery consultation.    This report was flagged in Epic as  abnormal.    Electronically signed by resident: Daphne Beltrán  Date:    03/06/2024  Time:    22:00    Electronically signed by: Denys Rader  Date:    03/06/2024  Time:    22:53               Narrative:    EXAMINATION:  MRI CERVICAL SPINE WITHOUT CONTRAST    CLINICAL HISTORY:  Myelopathy, acute, cervical spine;    TECHNIQUE:  Multiplanar, multisequence MR images of the cervical spine were performed without the administration of contrast.    COMPARISON:  CT cervical spine 03/06/2024    Cervical spine radiograph 03/06/2024, 02/06/2024    FINDINGS:  Partially visualized intracranial structures are unremarkable.    C1-C2: Dens is intact.  Pre dens space is maintained.    Alignment: Mild cervical kyphosis centered about C5-C6.    Vertebrae: No acute fracture.    Discs: Multilevel disc height loss, most severe at C6-C7.    Cord: Subtle increased STIR signal intensity at C6-C7.    Skull base and craniocervical junction: Normal.    Degenerative findings:    C2-C3: No spinal canal stenosis or neural foraminal narrowing.    C3-C4: Posterior disc osteophyte complex and uncovertebral joint hypertrophy result in mild spinal canal stenosis and bilateral mild neural foraminal narrowing.    C4-C5: Posterior disc osteophyte effaces the anterior thecal sac, and bilateral uncovertebral joint hypertrophy, left greater than right result in moderate left, mild right neural foraminal narrowing.    C5-C6: Posterior disc osteophyte complex in bilateral uncovertebral joint hypertrophy result in mild spinal canal stenosis and moderate left, severe right neural foraminal narrowing.    C6-C7: Posterior disc osteophyte complex with disc extrusion with extends 9.0 mm inferiorly from the C7 superior endplate.  Bilateral uncovertebral joint hypertrophy.  Findings result in severe spinal canal stenosis and bilateral severe neural foraminal narrowing.    C7-T1: Posterior disc osteophyte, left uncovertebral joint hypertrophy and mild facet  arthropathy result in mild spinal canal stenosis and moderate right neural foraminal narrowing.    Paraspinal muscles & soft tissues: Unremarkable.    The critical findings were 1st observed on 03/06/2024 at 21:59 by Daphne Beltrán MD, who 20 case the results by telephone to Otto Pelletier MD  on 03/06/2024 at 2206.  Patient name and medical record number were verified.  Read back was performed.                                       X-Ray Cervical Spine Flexion And Extension Only (Final result)  Result time 03/06/24 19:26:31      Final result by Esequiel Wyatt DO (03/06/24 19:26:31)                   Impression:      No acute osseous abnormality.      Electronically signed by: Esequiel Wyatt  Date:    03/06/2024  Time:    19:26               Narrative:    EXAMINATION:  XR CERVICAL SPINE FLEXION  AND EXTENSION ONLY    CLINICAL HISTORY:  Cervicalgia    TECHNIQUE:  Lateral view of the cervical spine were performed in flexion and extension.    COMPARISON:  None    FINDINGS:  There is no evidence of dynamic instability.  There is fixed grade 1 anterolisthesis of C4 on C5.  Alignment is otherwise normal.  Vertebral body heights and disc heights are preserved.  The C7 vertebra is not well seen due to overlying soft tissue structures.  There are multilevel degenerative changes, similar to prior.  There is no prevertebral soft tissue thickening.  There is scattered dental amalgam.  Remaining osseous structures are intact.                                       CT Cervical Spine Without Contrast (Final result)  Result time 03/06/24 18:14:58      Final result by Benson Rice MD (03/06/24 18:14:58)                   Impression:      No acute cervical fracture.    Multilevel cervical spondylosis most prominent at C3-4, C5-6 and C6-7 levels, as above.    Few additional findings as above.    Electronically signed by resident: Lakshmi Morales  Date:    03/06/2024  Time:    17:07    Electronically signed by: Benson Rice  MD  Date:    03/06/2024  Time:    18:14               Narrative:    EXAMINATION:  CT CERVICAL SPINE WITHOUT CONTRAST    CLINICAL HISTORY:  Motor neuron disease suspected;    TECHNIQUE:  Low dose axial images, sagittal and coronal reformations were performed though the cervical spine.  Contrast was not administered.    COMPARISON:  X-ray cervical spine 02/06/2024    FINDINGS:  Alignment: Mild reversal physiological curvature.  Mild anterolisthesis of C4 on C5.    Vertebrae: No fracture.  No lytic or blastic lesion.    Discs: Mild disc height loss at C5-C7.    C1-2: Mild degenerative change at the atlantodental interval.  Two small well corticated ossific bodies along the anterior aspect of the mid to upper dens likely degenerative related.  Dens is intact.  Pre-dens space is maintained.    Skull base and craniocervical junction: Unremarkable.    Degenerative findings:    C2-C3: No significant spinal canal stenosis or neural foraminal narrowing.    C3-C4: Posterior disc osteophyte complex resulting in mild acquired spinal canal stenosis with mild right and minimal left neural foraminal narrowing.    C4-C5: No significant spinal canal stenosis or neural foraminal narrowing.    C5-C6: Posterior disc osteophyte complex asymmetric to the right resulting in mild to moderate acquired spinal canal stenosis with mild right neural foraminal narrowing.  No significant left neural foraminal narrowing.    C6-C7: Posterior disc osteophyte complex asymmetric to the right resulting in moderate acquired spinal canal stenosis with mild right and minimal left neural foraminal narrowing.    C7-T1: No significant spinal canal stenosis.  Moderate right and mild left neural foraminal narrowing.    Paraspinal muscles & soft tissues: C2 level prevertebral free bony fragment versus soft tissue ossification.  Suspected anterior and posterior longitudinal ligament multifocal calcification at C3-C6 level.  Bilateral superior clavicle multiple  normal sized lymph nodes, otherwise no significant abnormality of cervical soft tissue.  Visualized portion of mediastinum and bilateral lung apices shows no acute abnormality.                                       Medications   glucagon (human recombinant) injection 1 mg (has no administration in time range)   dextrose 10% bolus 125 mL 125 mL (has no administration in time range)   dextrose 10% bolus 250 mL 250 mL (has no administration in time range)   HYDROcodone-acetaminophen 5-325 mg per tablet 1 tablet (has no administration in time range)   HYDROcodone-acetaminophen  mg per tablet 1 tablet (has no administration in time range)   methocarbamoL tablet 500 mg (has no administration in time range)   lisinopriL tablet 20 mg (20 mg Oral Given 3/7/24 1229)     And   hydroCHLOROthiazide tablet 12.5 mg (12.5 mg Oral Given 3/7/24 1229)   dextrose 40 % gel 16,000 mg (has no administration in time range)   dextrose 40 % gel 24,000 mg (has no administration in time range)   methocarbamoL tablet 500 mg (500 mg Oral Given 3/7/24 0926)     Medical Decision Making  Herniated disc in the cervical spine with signs of possible cord compression on MRI images and pt very symptomatic    I have reviewed the images of pt's outside MRI with the neurosurgery PA    Labs ordered  Will defer plan to Neurosurgery Service    Amount and/or Complexity of Data Reviewed  Labs: ordered. Decision-making details documented in ED Course.  Radiology: ordered.  Discussion of management or test interpretation with external provider(s):   LORIE SNIDER    Risk  Decision regarding hospitalization.               ED Course as of 03/07/24 1637   Wed Mar 06, 2024   1657 Discussed case with the neurosurgery PA.  They would like to repeat the patient's MRI C-spine. [AS]      ED Course User Index  [AS] Celia Iraheta MD                           Clinical Impression:  Final diagnoses:  [M48.02] Cervical stenosis of spinal canal (Primary)  [G95.9] Cervical  myelopathy  [M50.20] Cervical herniated disc  [M54.2] Neck pain          ED Disposition Condition    Admit                 Celia Iraheta MD  03/07/24 3316

## 2024-03-06 NOTE — ED TRIAGE NOTES
39 y/o F presents to ER with c/c back injury. Pt was seen at imaging services with MRI and sent to ER for slipped disc. Pt endorses intermittent bilateral UE and LE numbness and tingling. Denies c/p, SOB, N/V/D, fevers and chills.

## 2024-03-06 NOTE — HPI
Janae Fuller is a 39yo woman w/PMHx HTN referred to the ED by her PCP with concern for cervical myelopathy. She reports 2 months of worsening paresthesia that began in her L shoulder has progressively worsened to involve both upper and lower extremities. She has also developed weakness in her L hand which prevents her from holding objects or tying her shoes. Over the last week she has had difficulty ambulating and her family has noted she sometimes appears to lean to one side. She denies saddle anesthesia, bladder or bowel dysfunction.

## 2024-03-06 NOTE — CONSULTS
[facility] - Emergency Dept  Neurosurgery  Consult Note    Inpatient consult to Neurosurgery  Consult performed by: Yolanda Calles PA-C  Consult ordered by: Celia Iraheta MD        Subjective:     Chief Complaint/Reason for Admission: Cervical myelopathy    History of Present Illness: Janae Fuller is a 39yo woman w/PMHx HTN referred to the ED by her PCP with concern for cervical myelopathy. She reports 2 months of worsening paresthesia that began in her L shoulder has progressively worsened to involve both upper and lower extremities. She has also developed weakness in her L hand which prevents her from holding objects or tying her shoes. Over the last week she has had difficulty ambulating and her family has noted she sometimes appears to lean to one side. She denies saddle anesthesia, bladder or bowel dysfunction.       Review of patient's allergies indicates:   Allergen Reactions    Codeine Other (See Comments) and Hives     Reports shakes       Past Medical History:   Diagnosis Date    COVID-19 03/04/2021    Hypertension      Past Surgical History:   Procedure Laterality Date    JOINT REPLACEMENT Right 2003    Hip replacement     Family History    None       Tobacco Use    Smoking status: Never    Smokeless tobacco: Never   Substance and Sexual Activity    Alcohol use: Not Currently    Drug use: Never    Sexual activity: Yes     Partners: Male     Review of Systems  Objective:     Weight: 102.1 kg (225 lb)  Body mass index is 41.15 kg/m².  Vital Signs (Most Recent):  Temp: 98.4 °F (36.9 °C) (03/06/24 1737)  Pulse: 60 (03/06/24 1737)  Resp: 16 (03/06/24 1737)  BP: (!) 142/88 (03/06/24 1737)  SpO2: 100 % (03/06/24 1737) Vital Signs (24h Range):  Temp:  [98.3 °F (36.8 °C)-98.4 °F (36.9 °C)] 98.4 °F (36.9 °C)  Pulse:  [60-78] 60  Resp:  [16-17] 16  SpO2:  [99 %-100 %] 100 %  BP: (142-154)/(74-88) 142/88      Physical Exam  General: well developed, well nourished, no distress.   Head: normocephalic,  "atraumatic  Mental Status: Awake, Alert, Oriented  Speech: Clear with content appropriate to conversation  Cranial nerves: face symmetric, CN II-XII grossly intact.   Sensory: Loss of sensation along L anterior thigh, otherwise intact to light touch throughout    Motor Strength:     Strength  Deltoids Triceps Biceps Wrist Extension Wrist Flexion Hand    Upper: R 5/5 5/5 5/5 5/5 5/5 5/5    L 4-/5 4/5 4/5 3/5 3/5 3/5     Iliopsoas Quadriceps Knee  Flexion Tibialis  anterior Gastro- cnemius EHL   Lower: R 5/5 5/5 5/5 5/5 5/5 5/5    L 4-/5 5/5 5/5 5/5 5/5 5/5     Crawford: Positive R Crawford  Clonus: absent     Significant Labs:  Recent Labs   Lab 03/06/24  1504   GLU 96      K 4.0      CO2 24   BUN 8   CREATININE 0.9   CALCIUM 9.8     Recent Labs   Lab 03/06/24  1504   WBC 7.09   HGB 10.1*   HCT 31.1*        No results for input(s): "LABPT", "INR", "APTT" in the last 48 hours.  Microbiology Results (last 7 days)       ** No results found for the last 168 hours. **          All pertinent labs from the last 24 hours have been reviewed.    Significant Diagnostics:  I have reviewed and interpreted all pertinent imaging results/findings within the past 24 hours.  Assessment/Plan:     Cervical myelopathy  Janae Fuller is a 37yo woman w/PMHx HTN referred to the ED by her PCP with concern for cervical myelopathy. Patient with appreciable LUE weakness on exam. Further work up needed for preop planning.     - Recommend Cervical MRI without contrast  - Recommend CT Cervical Spine  - Recommend XR with flexion and extension  - Pain control per primary   - No acute Neurosurgical intervention planned at this time but we will continue to follow along    Case and plan discussed with attending Neurosurgeon Dr. Esquivel        Thank you for your consult.     Yolanda Calles PA-C  Neurosurgery  [facility] - Emergency Dept  "

## 2024-03-06 NOTE — SUBJECTIVE & OBJECTIVE
Review of patient's allergies indicates:   Allergen Reactions    Codeine Other (See Comments) and Hives     Reports shakes       Past Medical History:   Diagnosis Date    COVID-19 03/04/2021    Hypertension      Past Surgical History:   Procedure Laterality Date    JOINT REPLACEMENT Right 2003    Hip replacement     Family History    None       Tobacco Use    Smoking status: Never    Smokeless tobacco: Never   Substance and Sexual Activity    Alcohol use: Not Currently    Drug use: Never    Sexual activity: Yes     Partners: Male     Review of Systems  Objective:     Weight: 102.1 kg (225 lb)  Body mass index is 41.15 kg/m².  Vital Signs (Most Recent):  Temp: 98.4 °F (36.9 °C) (03/06/24 1737)  Pulse: 60 (03/06/24 1737)  Resp: 16 (03/06/24 1737)  BP: (!) 142/88 (03/06/24 1737)  SpO2: 100 % (03/06/24 1737) Vital Signs (24h Range):  Temp:  [98.3 °F (36.8 °C)-98.4 °F (36.9 °C)] 98.4 °F (36.9 °C)  Pulse:  [60-78] 60  Resp:  [16-17] 16  SpO2:  [99 %-100 %] 100 %  BP: (142-154)/(74-88) 142/88      Physical Exam  General: well developed, well nourished, no distress.   Head: normocephalic, atraumatic  Mental Status: Awake, Alert, Oriented  Speech: Clear with content appropriate to conversation  Cranial nerves: face symmetric, CN II-XII grossly intact.   Sensory: Loss of sensation along L anterior thigh, otherwise intact to light touch throughout    Motor Strength:     Strength  Deltoids Triceps Biceps Wrist Extension Wrist Flexion Hand    Upper: R 5/5 5/5 5/5 5/5 5/5 5/5    L 4-/5 4/5 4/5 3/5 3/5 3/5     Iliopsoas Quadriceps Knee  Flexion Tibialis  anterior Gastro- cnemius EHL   Lower: R 5/5 5/5 5/5 5/5 5/5 5/5    L 4-/5 5/5 5/5 5/5 5/5 5/5     Crawford: Positive R Crawford  Clonus: absent     Significant Labs:  Recent Labs   Lab 03/06/24  1504   GLU 96      K 4.0      CO2 24   BUN 8   CREATININE 0.9   CALCIUM 9.8     Recent Labs   Lab 03/06/24  1504   WBC 7.09   HGB 10.1*   HCT 31.1*        No results  "for input(s): "LABPT", "INR", "APTT" in the last 48 hours.  Microbiology Results (last 7 days)       ** No results found for the last 168 hours. **          All pertinent labs from the last 24 hours have been reviewed.    Significant Diagnostics:  I have reviewed and interpreted all pertinent imaging results/findings within the past 24 hours.  "

## 2024-03-06 NOTE — ASSESSMENT & PLAN NOTE
Janae Fuller is a 37yo woman w/PMHx HTN referred to the ED by her PCP with concern for cervical myelopathy. Patient with appreciable LUE weakness on exam. Further work up needed for preop planning.     - Recommend Cervical MRI without contrast  - Recommend CT Cervical Spine  - Recommend XR with flexion and extension  - Pain control per primary   - No acute Neurosurgical intervention planned at this time but we will continue to follow along    Case and plan discussed with attending Neurosurgeon Dr. Esquivel

## 2024-03-06 NOTE — ED NOTES
Patient identifiers for Janae Fuller 38 y.o. female checked and correct.  Chief Complaint   Patient presents with    Back Pain     Says she was sent er for back surgery for a slipped disc; had cd of mri      Past Medical History:   Diagnosis Date    COVID-19 03/04/2021    Hypertension      Allergies reported:   Review of patient's allergies indicates:   Allergen Reactions    Codeine Other (See Comments) and Hives     Reports shakes         LOC: Patient is awake, alert, and aware of environment with an appropriate affect. Patient is oriented x 4 and speaking appropriately.  APPEARANCE: Patient resting comfortably and in no acute distress. Patient is clean and well groomed, patient's clothing is properly fastened.  HEENT: - JVD, + midline trach  SKIN: The skin is warm and dry. Patient has normal skin turgor and moist mucus membranes.   MUSKULOSKELETAL: Patient is moving all extremities well, no obvious deformities noted. Pulses intact.   RESPIRATORY: Airway is open and patent. Respirations are spontaneous and non-labored with normal effort and rate.  CARDIAC: No peripheral edema noted. + 2 bilateral pedal and radial pulses, < 3 s cap refill.   ABDOMEN: No distention noted. Soft and non-tender upon palpation.  NEUROLOGICAL: pupils 4 mm, PERRL. Facial expression is symmetrical. Hand grasps are equal bilaterally. Normal sensation in all extremities when touched with finger. Endorses intermittent numbness and tingling to bilateral UE and LE.

## 2024-03-06 NOTE — DISCHARGE INSTRUCTIONS
Wound care:    Keep incisions dry. Do not soak under water (bathtub, swimming pool, etc.). Please shower with baby shampoo, but do not take a bath. If the incision becomes wet, gently pat it dry with a clean towel; do not rub.    With clean hands, apply bacitracin over your incision in the morning and at night for 2 weeks.     You have skin glue over your incision. Please do not pick at it or try to remove it. It will dissolve on its own.    Other post-op instructions:    No lifting anything heavier than a gallon of milk until cleared in post-operative visit.    No driving until cleared in your post-operative appointment. No driving while on narcotics.    You will be discharged home on 5 days of an antibiotic called Keflex to prevent infection. Take the entire 5 day course as prescribed.     It is imperative that any infection (such as a urinary tract infection) be treated immediately so that it cannot get into your bloodstream. If an infection ends up in your blood, it may infect your surgical hardware, thus requiring us to remove it.

## 2024-03-07 ENCOUNTER — ANESTHESIA EVENT (OUTPATIENT)
Dept: SURGERY | Facility: HOSPITAL | Age: 39
DRG: 472 | End: 2024-03-07
Payer: MEDICAID

## 2024-03-07 LAB
ABO + RH BLD: NORMAL
APTT PPP: 25.8 SEC (ref 21–32)
BLD GP AB SCN CELLS X3 SERPL QL: NORMAL
INR PPP: 1.1 (ref 0.8–1.2)
PROTHROMBIN TIME: 11.3 SEC (ref 9–12.5)
SPECIMEN OUTDATE: NORMAL

## 2024-03-07 PROCEDURE — 25000003 PHARM REV CODE 250

## 2024-03-07 PROCEDURE — 11000001 HC ACUTE MED/SURG PRIVATE ROOM

## 2024-03-07 PROCEDURE — 25000003 PHARM REV CODE 250: Performed by: EMERGENCY MEDICINE

## 2024-03-07 PROCEDURE — 25000003 PHARM REV CODE 250: Performed by: PHYSICIAN ASSISTANT

## 2024-03-07 PROCEDURE — 99233 SBSQ HOSP IP/OBS HIGH 50: CPT | Mod: ,,, | Performed by: PHYSICIAN ASSISTANT

## 2024-03-07 PROCEDURE — 86901 BLOOD TYPING SEROLOGIC RH(D): CPT | Performed by: PHYSICIAN ASSISTANT

## 2024-03-07 PROCEDURE — 85730 THROMBOPLASTIN TIME PARTIAL: CPT | Performed by: PHYSICIAN ASSISTANT

## 2024-03-07 PROCEDURE — 85610 PROTHROMBIN TIME: CPT | Performed by: PHYSICIAN ASSISTANT

## 2024-03-07 RX ORDER — HYDROCODONE BITARTRATE AND ACETAMINOPHEN 5; 325 MG/1; MG/1
1 TABLET ORAL EVERY 6 HOURS PRN
Status: DISCONTINUED | OUTPATIENT
Start: 2024-03-07 | End: 2024-03-11 | Stop reason: HOSPADM

## 2024-03-07 RX ORDER — METHOCARBAMOL 500 MG/1
500 TABLET, FILM COATED ORAL
Status: COMPLETED | OUTPATIENT
Start: 2024-03-07 | End: 2024-03-07

## 2024-03-07 RX ORDER — IBUPROFEN 200 MG
16 TABLET ORAL
Status: DISCONTINUED | OUTPATIENT
Start: 2024-03-07 | End: 2024-03-07

## 2024-03-07 RX ORDER — LISINOPRIL AND HYDROCHLOROTHIAZIDE 12.5; 2 MG/1; MG/1
1 TABLET ORAL DAILY
Status: DISCONTINUED | OUTPATIENT
Start: 2024-03-07 | End: 2024-03-07

## 2024-03-07 RX ORDER — HYDROCODONE BITARTRATE AND ACETAMINOPHEN 10; 325 MG/1; MG/1
1 TABLET ORAL EVERY 6 HOURS PRN
Status: DISCONTINUED | OUTPATIENT
Start: 2024-03-07 | End: 2024-03-11

## 2024-03-07 RX ORDER — POLYETHYLENE GLYCOL 3350 17 G/17G
17 POWDER, FOR SOLUTION ORAL 2 TIMES DAILY PRN
Status: DISCONTINUED | OUTPATIENT
Start: 2024-03-07 | End: 2024-03-11 | Stop reason: HOSPADM

## 2024-03-07 RX ORDER — IBUPROFEN 200 MG
24 TABLET ORAL
Status: DISCONTINUED | OUTPATIENT
Start: 2024-03-07 | End: 2024-03-07

## 2024-03-07 RX ORDER — ALBUTEROL SULFATE 90 UG/1
2 AEROSOL, METERED RESPIRATORY (INHALATION) EVERY 4 HOURS
Status: DISCONTINUED | OUTPATIENT
Start: 2024-03-07 | End: 2024-03-07

## 2024-03-07 RX ORDER — HYDROCHLOROTHIAZIDE 12.5 MG/1
12.5 TABLET ORAL DAILY
Status: DISCONTINUED | OUTPATIENT
Start: 2024-03-07 | End: 2024-03-11 | Stop reason: HOSPADM

## 2024-03-07 RX ORDER — METHOCARBAMOL 500 MG/1
500 TABLET, FILM COATED ORAL 4 TIMES DAILY PRN
Status: DISCONTINUED | OUTPATIENT
Start: 2024-03-07 | End: 2024-03-11 | Stop reason: HOSPADM

## 2024-03-07 RX ORDER — LISINOPRIL 20 MG/1
20 TABLET ORAL DAILY
Status: DISCONTINUED | OUTPATIENT
Start: 2024-03-07 | End: 2024-03-11 | Stop reason: HOSPADM

## 2024-03-07 RX ORDER — AMOXICILLIN 250 MG
1 CAPSULE ORAL 2 TIMES DAILY
Status: DISCONTINUED | OUTPATIENT
Start: 2024-03-07 | End: 2024-03-11 | Stop reason: HOSPADM

## 2024-03-07 RX ORDER — DEXTROSE 40 %
16 GEL (GRAM) ORAL
Status: DISCONTINUED | OUTPATIENT
Start: 2024-03-07 | End: 2024-03-11 | Stop reason: HOSPADM

## 2024-03-07 RX ORDER — GLUCAGON 1 MG
1 KIT INJECTION
Status: DISCONTINUED | OUTPATIENT
Start: 2024-03-07 | End: 2024-03-11 | Stop reason: HOSPADM

## 2024-03-07 RX ORDER — DEXTROSE 40 %
24 GEL (GRAM) ORAL
Status: DISCONTINUED | OUTPATIENT
Start: 2024-03-07 | End: 2024-03-11 | Stop reason: HOSPADM

## 2024-03-07 RX ADMIN — LISINOPRIL 20 MG: 20 TABLET ORAL at 12:03

## 2024-03-07 RX ADMIN — HYDROCHLOROTHIAZIDE 12.5 MG: 12.5 TABLET ORAL at 12:03

## 2024-03-07 RX ADMIN — HYDROCODONE BITARTRATE AND ACETAMINOPHEN 1 TABLET: 10; 325 TABLET ORAL at 09:03

## 2024-03-07 RX ADMIN — DOCUSATE SODIUM AND SENNOSIDES 1 TABLET: 8.6; 5 TABLET, FILM COATED ORAL at 09:03

## 2024-03-07 RX ADMIN — METHOCARBAMOL 500 MG: 500 TABLET ORAL at 09:03

## 2024-03-07 NOTE — ASSESSMENT & PLAN NOTE
Janae Fuller is a 37yo woman w/PMHx HTN referred to the ED by her PCP with concern for cervical myelopathy. Patient with appreciable LUE weakness on exam.     - Neuro stable on exam  - MRI cervical spine with spondylosis throughout. Significant central canal stenosis due to disc extrusion, at C5-6 and worse at C6-7. To a lesser degree disc extrusion present at C3-4   - No dynamic instability appreciate on flex/ex cervical spine  - Plan for OR tomorrow for C5-6 ACDF vs ACDR and C6-7 ACDF  - NPO midnight  - PTT/INR/T&S  - Pain control prn   - Hold AC/AP    Discussed with Dr. Esquivel

## 2024-03-07 NOTE — ED NOTES
Pt provided with breakfast by family member who just arrived at bedside. Pt states surgery will be tomorrow morning

## 2024-03-07 NOTE — PROGRESS NOTES
[facility] - Emergency Dept  Neurosurgery  Progress Note    Subjective:     History of Present Illness: Janae Fuller is a 37yo woman w/PMHx HTN referred to the ED by her PCP with concern for cervical myelopathy. She reports 2 months of worsening paresthesia that began in her L shoulder has progressively worsened to involve both upper and lower extremities. She has also developed weakness in her L hand which prevents her from holding objects or tying her shoes. Over the last week she has had difficulty ambulating and her family has noted she sometimes appears to lean to one side. She denies saddle anesthesia, bladder or bowel dysfunction.     Post-Op Info:  Procedure(s) (LRB):  DISCECTOMY, SPINE, CERVICAL, ANTERIOR APPROACH, WITH FUSION (N/A)       (Not in a hospital admission)      Review of patient's allergies indicates:   Allergen Reactions    Codeine Other (See Comments) and Hives     Reports shakes       Past Medical History:   Diagnosis Date    COVID-19 03/04/2021    Hypertension      Past Surgical History:   Procedure Laterality Date    JOINT REPLACEMENT Right 2003    Hip replacement     Family History    None       Tobacco Use    Smoking status: Never    Smokeless tobacco: Never   Substance and Sexual Activity    Alcohol use: Not Currently    Drug use: Never    Sexual activity: Yes     Partners: Male     Review of Systems  Objective:     Weight: 102.1 kg (225 lb)  Body mass index is 41.15 kg/m².  Vital Signs (Most Recent):  Temp: 98.4 °F (36.9 °C) (03/07/24 0824)  Pulse: 71 (03/07/24 0824)  Resp: 16 (03/07/24 0824)  BP: 135/77 (03/07/24 0824)  SpO2: 98 % (03/07/24 0824) Vital Signs (24h Range):  Temp:  [98.3 °F (36.8 °C)-98.6 °F (37 °C)] 98.4 °F (36.9 °C)  Pulse:  [60-78] 71  Resp:  [16-18] 16  SpO2:  [98 %-100 %] 98 %  BP: (133-162)/() 135/77                                 Physical Exam         Neurosurgery Physical Exam  General: well developed, well nourished, no distress.   Head: normocephalic,  "atraumatic  Mental Status: Awake, Alert, Oriented  Speech: Clear with content appropriate to conversation  Cranial nerves: face symmetric, CN II-XII grossly intact.   Sensory: Loss of sensation along L anterior thigh, otherwise intact to light touch throughout     Motor Strength:      Strength   Deltoids Triceps Biceps Wrist Extension Wrist Flexion Hand    Upper: R 5/5 5/5 5/5 5/5 5/5 5/5     L 4-/5 4/5 4/5 3/5 3/5 3/5       Iliopsoas Quadriceps Knee  Flexion Tibialis  anterior Gastro- cnemius EHL   Lower: R 5/5 5/5 5/5 5/5 5/5 5/5     L 4-/5 5/5 5/5 5/5 5/5 5/5      Crawford: Positive R Crawford  Clonus: absent  Significant Labs:  Recent Labs   Lab 03/06/24  1504   GLU 96      K 4.0      CO2 24   BUN 8   CREATININE 0.9   CALCIUM 9.8     Recent Labs   Lab 03/06/24  1504   WBC 7.09   HGB 10.1*   HCT 31.1*        No results for input(s): "LABPT", "INR", "APTT" in the last 48 hours.  Microbiology Results (last 7 days)       ** No results found for the last 168 hours. **          All pertinent labs from the last 24 hours have been reviewed.    Significant Diagnostics:    Assessment/Plan:     Cervical myelopathy  Janae Fuller is a 37yo woman w/PMHx HTN referred to the ED by her PCP with concern for cervical myelopathy. Patient with appreciable LUE weakness on exam.     - Neuro stable on exam  - MRI cervical spine with spondylosis throughout. Significant central canal stenosis due to disc extrusion, at C5-6 and worse at C6-7. To a lesser degree disc extrusion present at C3-4   - No dynamic instability appreciate on flex/ex cervical spine  - Plan for OR tomorrow for C5-6 ACDF vs ACDR and C6-7 ACDF  - NPO midnight  - PTT/INR/T&S  - Pain control prn   - Hold AC/AP    Discussed with Dr. Sierra Lees PAAnnC  Neurosurgery  [facility] - Emergency Dept  "

## 2024-03-07 NOTE — ED NOTES
Assumed care for pt after recieving report from nightshift RN. Pt. resting in bed in NAD, RR e/u. Vital signs stable and within desired limits at this time of assessment. Pt. offered bathroom assistance and denies need at this time. Explanation of care/wait provided. Pt verbalizes no needs at this time. Bed in low, locked position with rails up and call bell in reach. Pt's white board updated with today's care team and plan.     Patient identifiers for Janae Fuller 38 y.o. female checked and correct.  Chief Complaint   Patient presents with    Back Pain     Says she was sent er for back surgery for a slipped disc; had cd of mri      Past Medical History:   Diagnosis Date    COVID-19 03/04/2021    Hypertension      Allergies reported:   Review of patient's allergies indicates:   Allergen Reactions    Codeine Other (See Comments) and Hives     Reports shakes         LOC: Patient is awake, alert, and aware of environment with an appropriate affect. Patient is oriented x 4 and speaking appropriately.  APPEARANCE: Patient resting comfortably and in no acute distress. Patient is clean and well groomed, patient's clothing is properly fastened.  HEENT: WDL  SKIN: The skin is warm and dry. Patient has normal skin turgor and moist mucus membranes.   MUSKULOSKELETAL: Patient is moving all extremities well, no obvious deformities noted. Pulses intact. Pt has severe spasms when transferring from wheelchair to bed.   RESPIRATORY: Airway is open and patent. Respirations are spontaneous and non-labored with normal effort and rate.  CARDIAC: Patient has a normal rate and rhythm. 70 on cardiac monitor. No peripheral edema noted.   ABDOMEN: No distention noted. Soft and non-tender upon palpation.  NEUROLOGICAL: pupils 3mm, PERRL. Facial expression is symmetrical. Hand grasps are equal bilaterally. Normal sensation in all extremities when touched with finger.

## 2024-03-07 NOTE — ANESTHESIA PREPROCEDURE EVALUATION
[facility]  Anesthesia Pre-Operative Evaluation     Patient Name: Janae Fuller  YOB: 1985  MRN: [id number]  [facility]: [id number]      Code Status: Prior   Date of Procedure: 3/8/2024  Anesthesia: General Procedure: Procedure(s) (LRB):  DISCECTOMY, SPINE, CERVICAL, ANTERIOR APPROACH, WITH FUSION (N/A)  Pre-Operative Diagnosis: Cervical herniated disc [M50.20]  Cervical myelopathy [G95.9]  Proceduralist: Surgeon(s) and Role:     * Shila Esquivel MD - Primary          SUBJECTIVE:     Pre-operative evaluation for Procedure(s) (LRB):  DISCECTOMY, SPINE, CERVICAL, ANTERIOR APPROACH, WITH FUSION (N/A)     03/07/2024    Janae Fuller is a 38 y.o. female who  has a past medical history of COVID-19 (03/04/2021) and Hypertension.     Janae Fuller is a 39yo woman w/PMHx HTN referred to the ED by her PCP with concern for cervical myelopathy. She reports 2 months of worsening paresthesia that began in her L shoulder has progressively worsened to involve both upper and lower extremities. She has also developed weakness in her L hand which prevents her from holding objects or tying her shoes. Over the last week she has had difficulty ambulating and her family has noted she sometimes appears to lean to one side. She denies saddle anesthesia, bladder or bowel dysfunction. .     Patient now presents for the above procedure(s).       No current facility-administered medications for this encounter.      ________________________________________  No results found for this or any previous visit.    ________________________________________    Prev airway: None documented.       LDA:        Peripheral IV - Single Lumen 03/06/24 1505 20 G Right Antecubital (Active)   Number of days: 0       Drips: None documented.      Patient Active Problem List   Diagnosis    Pneumonia due to COVID-19 virus    Essential hypertension    Class 3 severe obesity with serious  comorbidity and body mass index (BMI) of 40.0 to 44.9 in adult    Rhabdomyolysis    Cervical myelopathy       Review of patient's allergies indicates:   Allergen Reactions    Codeine Other (See Comments) and Hives     Reports shakes       Current Inpatient Medications:       No current facility-administered medications on file prior to encounter.     Current Outpatient Medications on File Prior to Encounter   Medication Sig Dispense Refill    acetaminophen (TYLENOL) 650 MG TbSR Take 1 tablet (650 mg total) by mouth every 8 (eight) hours. 20 tablet 0    albuterol (PROVENTIL/VENTOLIN HFA) 90 mcg/actuation inhaler Inhale 2 puffs into the lungs every 4 (four) hours. Rescue 18 g 0    cetirizine (ZYRTEC) 10 MG tablet Take 1 tablet (10 mg total) by mouth once daily. 30 tablet 0    fluticasone propionate (FLONASE) 50 mcg/actuation nasal spray 1 spray (50 mcg total) by Each Nostril route 2 (two) times daily as needed for Rhinitis or Allergies. 16 g 0    ibuprofen (ADVIL,MOTRIN) 600 MG tablet Take 1 tablet (600 mg total) by mouth every 6 (six) hours as needed for Pain or Temperature greater than (100.4). 20 tablet 0    lisinopril-hydrochlorothiazide (PRINZIDE,ZESTORETIC) 20-12.5 mg per tablet Take 1 tablet by mouth once daily.      pulse oximeter (PULSE OXIMETER) device by Apply Externally route 2 (two) times a day. Use twice daily at 8 AM and 3 PM and record the value in MyChart as directed. 1 each 0       Past Surgical History:   Procedure Laterality Date    JOINT REPLACEMENT Right 2003    Hip replacement       Social History:  Tobacco Use: Low Risk  (3/6/2024)    Patient History     Smoking Tobacco Use: Never     Smokeless Tobacco Use: Never     Passive Exposure: Not on file       Alcohol Use: Not on file       OBJECTIVE:     Vital Signs Range:  BMI Readings from Last 1 Encounters:   03/06/24 41.15 kg/m²       Temp:  [36.9 °C (98.4 °F)-37 °C (98.6 °F)]   Pulse:  [70-71]   Resp:  [16-18]   BP: (133-162)/()   SpO2:   [98 %-100 %]        Significant Labs:        Component Value Date/Time    WBC 7.09 03/06/2024 1504    HGB 10.1 (L) 03/06/2024 1504    HCT 31.1 (L) 03/06/2024 1504     03/06/2024 1504     03/06/2024 1504    K 4.0 03/06/2024 1504     03/06/2024 1504    CO2 24 03/06/2024 1504    GLU 96 03/06/2024 1504    BUN 8 03/06/2024 1504    CREATININE 0.9 03/06/2024 1504    MG 2.3 03/13/2021 0255    PHOS 3.1 03/13/2021 0255    CALCIUM 9.8 03/06/2024 1504    ALBUMIN 3.9 03/06/2024 1504    PROT 8.2 03/06/2024 1504    ALKPHOS 52 (L) 03/06/2024 1504    BILITOT 0.2 03/06/2024 1504    AST 19 03/06/2024 1504    ALT 14 03/06/2024 1504    INR 1.0 02/07/2024 1231    INR 1.4 (H) 02/06/2024 1716        Please see Results Review for additional labs.     Diagnostic Studies: No relevant studies.    EKG:   Results for orders placed or performed during the hospital encounter of 02/06/24   EKG 12-lead    Collection Time: 02/06/24  4:52 PM   Result Value Ref Range    QRS Duration 74 ms    [facility] QTC Calculation 442 ms    Narrative    Test Reason : R07.9,    Vent. Rate : 088 BPM     Atrial Rate : 088 BPM     P-R Int : 146 ms          QRS Dur : 074 ms      QT Int : 366 ms       P-R-T Axes : 052 038 015 degrees     QTc Int : 442 ms    Normal sinus rhythm  Nonspecific ST abnormality  Abnormal ECG  When compared with ECG of 10-MAR-2021 10:59,  No significant change was found  Confirmed by Trevor Hobbs MD ([id number]) on 2/7/2024 5:38:58 PM    Referred By: GINGERERR   SELF           Confirmed By:Trevor Hobbs MD       ECHO:  See subjective, if available.      ASSESSMENT/PLAN:           Pre-op Assessment    I have reviewed the Patient Summary Reports.     I have reviewed the Nursing Notes. I have reviewed the NPO Status.   I have reviewed the Medications.     Review of Systems  Anesthesia Hx:  No problems with previous Anesthesia   History of prior surgery of interest to airway management or planning:  Previous anesthesia: General         Denies Family Hx of Anesthesia complications.    Denies Personal Hx of Anesthesia complications.                    Social:  Non-Smoker, No Alcohol Use       Cardiovascular:     Hypertension   Denies MI.                                   Hypertension         Pulmonary:  Pneumonia                  Pulmonary Infection:  Pneumonia.     Hepatic/GI:      Denies GERD.             Neurological:    Neuromuscular Disease,                                 Neuromuscular Disease   Endocrine:  Denies Diabetes.               Physical Exam  General: Well nourished, Cooperative, Alert and Oriented    Airway:  Mallampati: II   Mouth Opening: Normal  TM Distance: Normal  Neck ROM: Normal ROM    Dental:  Intact        Anesthesia Plan  Type of Anesthesia, risks & benefits discussed:    Anesthesia Type: Gen ETT  Intra-op Monitoring Plan: Standard ASA Monitors  Post Op Pain Control Plan: multimodal analgesia and IV/PO Opioids PRN  Induction:  IV  Airway Plan: Direct and Video, Post-Induction  Informed Consent: Informed consent signed with the Patient and all parties understand the risks and agree with anesthesia plan.  All questions answered.   ASA Score: 3  Day of Surgery Review of History & Physical: H&P Update referred to the surgeon/provider.I have interviewed and examined the patient. I have reviewed the patient's H&P dated:     Ready For Surgery From Anesthesia Perspective.     .

## 2024-03-07 NOTE — PROVIDER PROGRESS NOTES - EMERGENCY DEPT.
Emergency Department Interval Progress Note    Janae Fuller   38 y.o. female   [id number]      3/6/2024       History     This patient was signed out to me by Dr. Iraheta at shift change. She was directed to the ED by her PCP after an outpatient MRI had concerning findings. She's had paresthesias throughout her body for a month with difficulty using her hands and intermittent urinary incontinence. She was pending neurosurgery recommendations when I took over her care.               Physical Examination     No apparent distress. Reports mild pain to neck but not wanting pain medication.       Labs     Labs Reviewed   CBC W/ AUTO DIFFERENTIAL - Abnormal; Notable for the following components:       Result Value    RBC 3.80 (*)     Hemoglobin 10.1 (*)     Hematocrit 31.1 (*)     MCH 26.6 (*)     RDW 18.1 (*)     All other components within normal limits   COMPREHENSIVE METABOLIC PANEL - Abnormal; Notable for the following components:    Alkaline Phosphatase 52 (*)     Anion Gap 7 (*)     All other components within normal limits   HIV 1 / 2 ANTIBODY    Narrative:     Release to patient->Immediate   HEPATITIS C ANTIBODY    Narrative:     Release to patient->Immediate   POCT URINE PREGNANCY        Imaging     Imaging Results               MRI Cervical Spine Without Contrast (Final result)  Result time 03/06/24 22:53:22      Final result by Denys Rader MD (03/06/24 22:53:22)                   Impression:      Cervical spine spondylosis most severe at C6-C7 with disc extrusion resulting in severe spinal canal stenosis and bilateral severe neural foraminal narrowing.  Subtle increased STIR signal intensity at this level suggestive of compressive myelopathy.  Recommend spine surgery consultation.    This report was flagged in Epic as abnormal.    Electronically signed by resident: Daphne Beltrán  Date:    03/06/2024  Time:    22:00    Electronically signed by: Denys Rader  Date:    03/06/2024  Time:    22:53                Narrative:    EXAMINATION:  MRI CERVICAL SPINE WITHOUT CONTRAST    CLINICAL HISTORY:  Myelopathy, acute, cervical spine;    TECHNIQUE:  Multiplanar, multisequence MR images of the cervical spine were performed without the administration of contrast.    COMPARISON:  CT cervical spine 03/06/2024    Cervical spine radiograph 03/06/2024, 02/06/2024    FINDINGS:  Partially visualized intracranial structures are unremarkable.    C1-C2: Dens is intact.  Pre dens space is maintained.    Alignment: Mild cervical kyphosis centered about C5-C6.    Vertebrae: No acute fracture.    Discs: Multilevel disc height loss, most severe at C6-C7.    Cord: Subtle increased STIR signal intensity at C6-C7.    Skull base and craniocervical junction: Normal.    Degenerative findings:    C2-C3: No spinal canal stenosis or neural foraminal narrowing.    C3-C4: Posterior disc osteophyte complex and uncovertebral joint hypertrophy result in mild spinal canal stenosis and bilateral mild neural foraminal narrowing.    C4-C5: Posterior disc osteophyte effaces the anterior thecal sac, and bilateral uncovertebral joint hypertrophy, left greater than right result in moderate left, mild right neural foraminal narrowing.    C5-C6: Posterior disc osteophyte complex in bilateral uncovertebral joint hypertrophy result in mild spinal canal stenosis and moderate left, severe right neural foraminal narrowing.    C6-C7: Posterior disc osteophyte complex with disc extrusion with extends 9.0 mm inferiorly from the C7 superior endplate.  Bilateral uncovertebral joint hypertrophy.  Findings result in severe spinal canal stenosis and bilateral severe neural foraminal narrowing.    C7-T1: Posterior disc osteophyte, left uncovertebral joint hypertrophy and mild facet arthropathy result in mild spinal canal stenosis and moderate right neural foraminal narrowing.    Paraspinal muscles & soft tissues: Unremarkable.    The critical findings were 1st observed on  03/06/2024 at 21:59 by Daphne Beltrán MD, who 20 case the results by telephone to Otto Pelletier MD  on 03/06/2024 at 2206.  Patient name and medical record number were verified.  Read back was performed.                                       X-Ray Cervical Spine Flexion And Extension Only (Final result)  Result time 03/06/24 19:26:31      Final result by Esequiel Wyatt DO (03/06/24 19:26:31)                   Impression:      No acute osseous abnormality.      Electronically signed by: Esequiel Wyatt  Date:    03/06/2024  Time:    19:26               Narrative:    EXAMINATION:  XR CERVICAL SPINE FLEXION  AND EXTENSION ONLY    CLINICAL HISTORY:  Cervicalgia    TECHNIQUE:  Lateral view of the cervical spine were performed in flexion and extension.    COMPARISON:  None    FINDINGS:  There is no evidence of dynamic instability.  There is fixed grade 1 anterolisthesis of C4 on C5.  Alignment is otherwise normal.  Vertebral body heights and disc heights are preserved.  The C7 vertebra is not well seen due to overlying soft tissue structures.  There are multilevel degenerative changes, similar to prior.  There is no prevertebral soft tissue thickening.  There is scattered dental amalgam.  Remaining osseous structures are intact.                                       CT Cervical Spine Without Contrast (Final result)  Result time 03/06/24 18:14:58      Final result by Benson Rice MD (03/06/24 18:14:58)                   Impression:      No acute cervical fracture.    Multilevel cervical spondylosis most prominent at C3-4, C5-6 and C6-7 levels, as above.    Few additional findings as above.    Electronically signed by resident: Lakshmi Morales  Date:    03/06/2024  Time:    17:07    Electronically signed by: Benson Rice MD  Date:    03/06/2024  Time:    18:14               Narrative:    EXAMINATION:  CT CERVICAL SPINE WITHOUT CONTRAST    CLINICAL HISTORY:  Motor neuron disease suspected;    TECHNIQUE:  Low dose axial images,  sagittal and coronal reformations were performed though the cervical spine.  Contrast was not administered.    COMPARISON:  X-ray cervical spine 02/06/2024    FINDINGS:  Alignment: Mild reversal physiological curvature.  Mild anterolisthesis of C4 on C5.    Vertebrae: No fracture.  No lytic or blastic lesion.    Discs: Mild disc height loss at C5-C7.    C1-2: Mild degenerative change at the atlantodental interval.  Two small well corticated ossific bodies along the anterior aspect of the mid to upper dens likely degenerative related.  Dens is intact.  Pre-dens space is maintained.    Skull base and craniocervical junction: Unremarkable.    Degenerative findings:    C2-C3: No significant spinal canal stenosis or neural foraminal narrowing.    C3-C4: Posterior disc osteophyte complex resulting in mild acquired spinal canal stenosis with mild right and minimal left neural foraminal narrowing.    C4-C5: No significant spinal canal stenosis or neural foraminal narrowing.    C5-C6: Posterior disc osteophyte complex asymmetric to the right resulting in mild to moderate acquired spinal canal stenosis with mild right neural foraminal narrowing.  No significant left neural foraminal narrowing.    C6-C7: Posterior disc osteophyte complex asymmetric to the right resulting in moderate acquired spinal canal stenosis with mild right and minimal left neural foraminal narrowing.    C7-T1: No significant spinal canal stenosis.  Moderate right and mild left neural foraminal narrowing.    Paraspinal muscles & soft tissues: C2 level prevertebral free bony fragment versus soft tissue ossification.  Suspected anterior and posterior longitudinal ligament multifocal calcification at C3-C6 level.  Bilateral superior clavicle multiple normal sized lymph nodes, otherwise no significant abnormality of cervical soft tissue.  Visualized portion of mediastinum and bilateral lung apices shows no acute abnormality.                                         ED Course     The patient received the following medications:  Medications - No data to display    Procedures    ED Course as of 03/07/24 0150   Wed Mar 06, 2024   1657 Discussed case with the neurosurgery PA.  They would like to repeat the patient's MRI C-spine. [AS]      ED Course User Index  [AS] Celia Iraheta MD        Medical Decision Making     Severe canal stenosis on MRI with signal abnormality concerning for myelopathy. Called and discussed findings with neurosurgery resident, Dr. Martinez, who will admit the patient.          Diagnoses       ICD-10-CM ICD-9-CM   1. Cervical stenosis of spinal canal  M48.02 723.0   2. Cervical myelopathy  G95.9 721.1   3. Cervical herniated disc  M50.20 722.0   4. Neck pain  M54.2 723.1         Dispostion      ED Disposition Condition    Observation Stable

## 2024-03-07 NOTE — SUBJECTIVE & OBJECTIVE
(Not in a hospital admission)      Review of patient's allergies indicates:   Allergen Reactions    Codeine Other (See Comments) and Hives     Reports shakes       Past Medical History:   Diagnosis Date    COVID-19 03/04/2021    Hypertension      Past Surgical History:   Procedure Laterality Date    JOINT REPLACEMENT Right 2003    Hip replacement     Family History    None       Tobacco Use    Smoking status: Never    Smokeless tobacco: Never   Substance and Sexual Activity    Alcohol use: Not Currently    Drug use: Never    Sexual activity: Yes     Partners: Male     Review of Systems  Objective:     Weight: 102.1 kg (225 lb)  Body mass index is 41.15 kg/m².  Vital Signs (Most Recent):  Temp: 98.4 °F (36.9 °C) (03/07/24 0824)  Pulse: 71 (03/07/24 0824)  Resp: 16 (03/07/24 0824)  BP: 135/77 (03/07/24 0824)  SpO2: 98 % (03/07/24 0824) Vital Signs (24h Range):  Temp:  [98.3 °F (36.8 °C)-98.6 °F (37 °C)] 98.4 °F (36.9 °C)  Pulse:  [60-78] 71  Resp:  [16-18] 16  SpO2:  [98 %-100 %] 98 %  BP: (133-162)/() 135/77                                 Physical Exam         Neurosurgery Physical Exam  General: well developed, well nourished, no distress.   Head: normocephalic, atraumatic  Mental Status: Awake, Alert, Oriented  Speech: Clear with content appropriate to conversation  Cranial nerves: face symmetric, CN II-XII grossly intact.   Sensory: Loss of sensation along L anterior thigh, otherwise intact to light touch throughout     Motor Strength:      Strength   Deltoids Triceps Biceps Wrist Extension Wrist Flexion Hand    Upper: R 5/5 5/5 5/5 5/5 5/5 5/5     L 4-/5 4/5 4/5 3/5 3/5 3/5       Iliopsoas Quadriceps Knee  Flexion Tibialis  anterior Gastro- cnemius EHL   Lower: R 5/5 5/5 5/5 5/5 5/5 5/5     L 4-/5 5/5 5/5 5/5 5/5 5/5      Crawford: Positive R Crawford  Clonus: absent  Significant Labs:  Recent Labs   Lab 03/06/24  1504   GLU 96      K 4.0      CO2 24   BUN 8   CREATININE 0.9   CALCIUM 9.8  "    Recent Labs   Lab 03/06/24  1504   WBC 7.09   HGB 10.1*   HCT 31.1*        No results for input(s): "LABPT", "INR", "APTT" in the last 48 hours.  Microbiology Results (last 7 days)       ** No results found for the last 168 hours. **          All pertinent labs from the last 24 hours have been reviewed.    Significant Diagnostics:    "

## 2024-03-07 NOTE — HOSPITAL COURSE
3/7: Neuro stable on exam. MRI with significant cervical stenosis at C6-7, as well as C5-6. Plan for OR tomorrow for C5-6 arthroplasty vs fusion and C6-7 ACDF. NPO midnight.   3/8: Proceed to OR today

## 2024-03-08 ENCOUNTER — ANESTHESIA (OUTPATIENT)
Dept: SURGERY | Facility: HOSPITAL | Age: 39
DRG: 472 | End: 2024-03-08
Payer: MEDICAID

## 2024-03-08 PROBLEM — R20.0 SENSORY LOSS: Status: ACTIVE | Noted: 2024-03-08

## 2024-03-08 PROBLEM — E87.1 HYPONATREMIA: Status: ACTIVE | Noted: 2024-03-08

## 2024-03-08 PROBLEM — E66.01 SEVERE OBESITY (BMI >= 40): Status: ACTIVE | Noted: 2024-03-08

## 2024-03-08 LAB
ANION GAP SERPL CALC-SCNC: 10 MMOL/L (ref 8–16)
BASOPHILS # BLD AUTO: 0.02 K/UL (ref 0–0.2)
BASOPHILS NFR BLD: 0.3 % (ref 0–1.9)
BUN SERPL-MCNC: 13 MG/DL (ref 6–20)
CALCIUM SERPL-MCNC: 9.6 MG/DL (ref 8.7–10.5)
CHLORIDE SERPL-SCNC: 104 MMOL/L (ref 95–110)
CO2 SERPL-SCNC: 21 MMOL/L (ref 23–29)
CREAT SERPL-MCNC: 0.9 MG/DL (ref 0.5–1.4)
DIFFERENTIAL METHOD BLD: ABNORMAL
EOSINOPHIL # BLD AUTO: 0.1 K/UL (ref 0–0.5)
EOSINOPHIL NFR BLD: 1.9 % (ref 0–8)
ERYTHROCYTE [DISTWIDTH] IN BLOOD BY AUTOMATED COUNT: 18.5 % (ref 11.5–14.5)
EST. GFR  (NO RACE VARIABLE): >60 ML/MIN/1.73 M^2
GLUCOSE SERPL-MCNC: 101 MG/DL (ref 70–110)
HCT VFR BLD AUTO: 29.1 % (ref 37–48.5)
HGB BLD-MCNC: 9.2 G/DL (ref 12–16)
IMM GRANULOCYTES # BLD AUTO: 0.01 K/UL (ref 0–0.04)
IMM GRANULOCYTES NFR BLD AUTO: 0.2 % (ref 0–0.5)
LYMPHOCYTES # BLD AUTO: 2.5 K/UL (ref 1–4.8)
LYMPHOCYTES NFR BLD: 41.9 % (ref 18–48)
MCH RBC QN AUTO: 26.1 PG (ref 27–31)
MCHC RBC AUTO-ENTMCNC: 31.6 G/DL (ref 32–36)
MCV RBC AUTO: 82 FL (ref 82–98)
MONOCYTES # BLD AUTO: 0.6 K/UL (ref 0.3–1)
MONOCYTES NFR BLD: 10.1 % (ref 4–15)
NEUTROPHILS # BLD AUTO: 2.7 K/UL (ref 1.8–7.7)
NEUTROPHILS NFR BLD: 45.6 % (ref 38–73)
NRBC BLD-RTO: 0 /100 WBC
PLATELET # BLD AUTO: 354 K/UL (ref 150–450)
PMV BLD AUTO: 10.1 FL (ref 9.2–12.9)
POTASSIUM SERPL-SCNC: 3.6 MMOL/L (ref 3.5–5.1)
RBC # BLD AUTO: 3.53 M/UL (ref 4–5.4)
SODIUM SERPL-SCNC: 135 MMOL/L (ref 136–145)
WBC # BLD AUTO: 5.87 K/UL (ref 3.9–12.7)

## 2024-03-08 PROCEDURE — 0RB30ZZ EXCISION OF CERVICAL VERTEBRAL DISC, OPEN APPROACH: ICD-10-PCS | Performed by: NEUROLOGICAL SURGERY

## 2024-03-08 PROCEDURE — 99233 SBSQ HOSP IP/OBS HIGH 50: CPT | Mod: ,,, | Performed by: PHYSICIAN ASSISTANT

## 2024-03-08 PROCEDURE — C1713 ANCHOR/SCREW BN/BN,TIS/BN: HCPCS | Performed by: NEUROLOGICAL SURGERY

## 2024-03-08 PROCEDURE — 22552 ARTHRD ANT NTRBD CERVICAL EA: CPT | Mod: ,,, | Performed by: NEUROLOGICAL SURGERY

## 2024-03-08 PROCEDURE — 99900035 HC TECH TIME PER 15 MIN (STAT)

## 2024-03-08 PROCEDURE — 22551 ARTHRD ANT NTRBDY CERVICAL: CPT | Mod: 62,,, | Performed by: NEUROLOGICAL SURGERY

## 2024-03-08 PROCEDURE — 25000003 PHARM REV CODE 250

## 2024-03-08 PROCEDURE — 80048 BASIC METABOLIC PNL TOTAL CA: CPT | Performed by: PHYSICIAN ASSISTANT

## 2024-03-08 PROCEDURE — 22551 ARTHRD ANT NTRBDY CERVICAL: CPT | Mod: ,,, | Performed by: NEUROLOGICAL SURGERY

## 2024-03-08 PROCEDURE — 71000015 HC POSTOP RECOV 1ST HR: Performed by: NEUROLOGICAL SURGERY

## 2024-03-08 PROCEDURE — 25000003 PHARM REV CODE 250: Performed by: PHYSICIAN ASSISTANT

## 2024-03-08 PROCEDURE — 37000009 HC ANESTHESIA EA ADD 15 MINS: Performed by: NEUROLOGICAL SURGERY

## 2024-03-08 PROCEDURE — 85025 COMPLETE CBC W/AUTO DIFF WBC: CPT | Performed by: PHYSICIAN ASSISTANT

## 2024-03-08 PROCEDURE — 27000221 HC OXYGEN, UP TO 24 HOURS

## 2024-03-08 PROCEDURE — C1729 CATH, DRAINAGE: HCPCS | Performed by: NEUROLOGICAL SURGERY

## 2024-03-08 PROCEDURE — C1889 IMPLANT/INSERT DEVICE, NOC: HCPCS | Performed by: NEUROLOGICAL SURGERY

## 2024-03-08 PROCEDURE — 22845 INSERT SPINE FIXATION DEVICE: CPT | Mod: 59,,, | Performed by: NEUROLOGICAL SURGERY

## 2024-03-08 PROCEDURE — 71000033 HC RECOVERY, INTIAL HOUR: Performed by: NEUROLOGICAL SURGERY

## 2024-03-08 PROCEDURE — D9220A PRA ANESTHESIA: Mod: AA,,, | Performed by: ANESTHESIOLOGY

## 2024-03-08 PROCEDURE — 63600175 PHARM REV CODE 636 W HCPCS: Performed by: NEUROLOGICAL SURGERY

## 2024-03-08 PROCEDURE — 11000001 HC ACUTE MED/SURG PRIVATE ROOM

## 2024-03-08 PROCEDURE — 22552 ARTHRD ANT NTRBD CERVICAL EA: CPT | Mod: 62,,, | Performed by: NEUROLOGICAL SURGERY

## 2024-03-08 PROCEDURE — 22853 INSJ BIOMECHANICAL DEVICE: CPT | Mod: ,,, | Performed by: NEUROLOGICAL SURGERY

## 2024-03-08 PROCEDURE — 22853 INSJ BIOMECHANICAL DEVICE: CPT | Mod: 82,,, | Performed by: NEUROLOGICAL SURGERY

## 2024-03-08 PROCEDURE — 0RG20A0 FUSION OF 2 OR MORE CERVICAL VERTEBRAL JOINTS WITH INTERBODY FUSION DEVICE, ANTERIOR APPROACH, ANTERIOR COLUMN, OPEN APPROACH: ICD-10-PCS | Performed by: NEUROLOGICAL SURGERY

## 2024-03-08 PROCEDURE — 36415 COLL VENOUS BLD VENIPUNCTURE: CPT | Performed by: PHYSICIAN ASSISTANT

## 2024-03-08 PROCEDURE — 22845 INSERT SPINE FIXATION DEVICE: CPT | Mod: 59,82,, | Performed by: NEUROLOGICAL SURGERY

## 2024-03-08 PROCEDURE — 36000711: Performed by: NEUROLOGICAL SURGERY

## 2024-03-08 PROCEDURE — 63600175 PHARM REV CODE 636 W HCPCS

## 2024-03-08 PROCEDURE — 25000003 PHARM REV CODE 250: Performed by: NEUROLOGICAL SURGERY

## 2024-03-08 PROCEDURE — 37000008 HC ANESTHESIA 1ST 15 MINUTES: Performed by: NEUROLOGICAL SURGERY

## 2024-03-08 PROCEDURE — 01N10ZZ RELEASE CERVICAL NERVE, OPEN APPROACH: ICD-10-PCS | Performed by: NEUROLOGICAL SURGERY

## 2024-03-08 PROCEDURE — 94761 N-INVAS EAR/PLS OXIMETRY MLT: CPT

## 2024-03-08 PROCEDURE — 36000710: Performed by: NEUROLOGICAL SURGERY

## 2024-03-08 PROCEDURE — 27201423 OPTIME MED/SURG SUP & DEVICES STERILE SUPPLY: Performed by: NEUROLOGICAL SURGERY

## 2024-03-08 DEVICE — ACTIFUSE SHAPE SM CYL 15X9MM: Type: IMPLANTABLE DEVICE | Site: SPINE CERVICAL | Status: FUNCTIONAL

## 2024-03-08 RX ORDER — DEXAMETHASONE SODIUM PHOSPHATE 100 MG/10ML
INJECTION INTRAMUSCULAR; INTRAVENOUS
Status: DISCONTINUED | OUTPATIENT
Start: 2024-03-08 | End: 2024-03-08

## 2024-03-08 RX ORDER — PREGABALIN 75 MG/1
75 CAPSULE ORAL 2 TIMES DAILY
Status: DISCONTINUED | OUTPATIENT
Start: 2024-03-08 | End: 2024-03-11 | Stop reason: HOSPADM

## 2024-03-08 RX ORDER — CEFAZOLIN SODIUM 1 G/3ML
INJECTION, POWDER, FOR SOLUTION INTRAMUSCULAR; INTRAVENOUS
Status: DISCONTINUED | OUTPATIENT
Start: 2024-03-08 | End: 2024-03-08

## 2024-03-08 RX ORDER — PHENYLEPHRINE HYDROCHLORIDE 10 MG/ML
INJECTION INTRAVENOUS
Status: DISCONTINUED | OUTPATIENT
Start: 2024-03-08 | End: 2024-03-08

## 2024-03-08 RX ORDER — FENTANYL CITRATE 50 UG/ML
INJECTION, SOLUTION INTRAMUSCULAR; INTRAVENOUS
Status: DISCONTINUED | OUTPATIENT
Start: 2024-03-08 | End: 2024-03-08

## 2024-03-08 RX ORDER — PROPOFOL 10 MG/ML
VIAL (ML) INTRAVENOUS
Status: DISCONTINUED | OUTPATIENT
Start: 2024-03-08 | End: 2024-03-08

## 2024-03-08 RX ORDER — HYDROMORPHONE HYDROCHLORIDE 2 MG/ML
INJECTION, SOLUTION INTRAMUSCULAR; INTRAVENOUS; SUBCUTANEOUS
Status: DISCONTINUED | OUTPATIENT
Start: 2024-03-08 | End: 2024-03-08

## 2024-03-08 RX ORDER — PROPOFOL 10 MG/ML
INJECTION, EMULSION INTRAVENOUS CONTINUOUS PRN
Status: DISCONTINUED | OUTPATIENT
Start: 2024-03-08 | End: 2024-03-08

## 2024-03-08 RX ORDER — MIDAZOLAM HYDROCHLORIDE 5 MG/ML
INJECTION INTRAMUSCULAR; INTRAVENOUS
Status: DISCONTINUED | OUTPATIENT
Start: 2024-03-08 | End: 2024-03-08

## 2024-03-08 RX ORDER — ACETAMINOPHEN 10 MG/ML
INJECTION, SOLUTION INTRAVENOUS
Status: DISCONTINUED | OUTPATIENT
Start: 2024-03-08 | End: 2024-03-08

## 2024-03-08 RX ORDER — BACITRACIN ZINC 500 UNIT/G
OINTMENT (GRAM) TOPICAL
Status: DISCONTINUED | OUTPATIENT
Start: 2024-03-08 | End: 2024-03-08 | Stop reason: HOSPADM

## 2024-03-08 RX ORDER — SODIUM CHLORIDE 0.9 % (FLUSH) 0.9 %
10 SYRINGE (ML) INJECTION
Status: DISCONTINUED | OUTPATIENT
Start: 2024-03-08 | End: 2024-03-08 | Stop reason: HOSPADM

## 2024-03-08 RX ORDER — HYDROMORPHONE HYDROCHLORIDE 1 MG/ML
INJECTION, SOLUTION INTRAMUSCULAR; INTRAVENOUS; SUBCUTANEOUS
Status: DISCONTINUED | OUTPATIENT
Start: 2024-03-08 | End: 2024-03-08

## 2024-03-08 RX ORDER — HALOPERIDOL 5 MG/ML
0.5 INJECTION INTRAMUSCULAR EVERY 10 MIN PRN
Status: DISCONTINUED | OUTPATIENT
Start: 2024-03-08 | End: 2024-03-08 | Stop reason: HOSPADM

## 2024-03-08 RX ORDER — LIDOCAINE HYDROCHLORIDE AND EPINEPHRINE 10; 10 MG/ML; UG/ML
INJECTION, SOLUTION INFILTRATION; PERINEURAL
Status: DISCONTINUED | OUTPATIENT
Start: 2024-03-08 | End: 2024-03-08 | Stop reason: HOSPADM

## 2024-03-08 RX ORDER — SUCCINYLCHOLINE CHLORIDE 20 MG/ML
INJECTION INTRAMUSCULAR; INTRAVENOUS
Status: DISCONTINUED | OUTPATIENT
Start: 2024-03-08 | End: 2024-03-08

## 2024-03-08 RX ORDER — MORPHINE SULFATE 2 MG/ML
2 INJECTION, SOLUTION INTRAMUSCULAR; INTRAVENOUS EVERY 4 HOURS PRN
Status: DISCONTINUED | OUTPATIENT
Start: 2024-03-08 | End: 2024-03-11

## 2024-03-08 RX ORDER — LIDOCAINE HYDROCHLORIDE 20 MG/ML
INJECTION INTRAVENOUS
Status: DISCONTINUED | OUTPATIENT
Start: 2024-03-08 | End: 2024-03-08

## 2024-03-08 RX ORDER — HYDROMORPHONE HYDROCHLORIDE 1 MG/ML
0.2 INJECTION, SOLUTION INTRAMUSCULAR; INTRAVENOUS; SUBCUTANEOUS EVERY 5 MIN PRN
Status: DISCONTINUED | OUTPATIENT
Start: 2024-03-08 | End: 2024-03-08 | Stop reason: HOSPADM

## 2024-03-08 RX ADMIN — MIDAZOLAM 2 MG: 5 INJECTION INTRAMUSCULAR; INTRAVENOUS at 07:03

## 2024-03-08 RX ADMIN — PROPOFOL 100 MCG/KG/MIN: 10 INJECTION, EMULSION INTRAVENOUS at 07:03

## 2024-03-08 RX ADMIN — PROPOFOL 50 MG: 10 INJECTION, EMULSION INTRAVENOUS at 08:03

## 2024-03-08 RX ADMIN — DEXAMETHASONE SODIUM PHOSPHATE 8 MG: 10 INJECTION INTRAMUSCULAR; INTRAVENOUS at 08:03

## 2024-03-08 RX ADMIN — GLYCOPYRROLATE 0.2 MG: 0.2 INJECTION, SOLUTION INTRAMUSCULAR; INTRAVENOUS at 08:03

## 2024-03-08 RX ADMIN — CEFAZOLIN 2 G: 330 INJECTION, POWDER, FOR SOLUTION INTRAMUSCULAR; INTRAVENOUS at 12:03

## 2024-03-08 RX ADMIN — ACETAMINOPHEN 1000 MG: 10 INJECTION, SOLUTION INTRAVENOUS at 11:03

## 2024-03-08 RX ADMIN — PHENYLEPHRINE HYDROCHLORIDE 50 MCG: 10 INJECTION INTRAVENOUS at 07:03

## 2024-03-08 RX ADMIN — SODIUM CHLORIDE 0.2 MCG/KG/MIN: 9 INJECTION, SOLUTION INTRAVENOUS at 07:03

## 2024-03-08 RX ADMIN — DOCUSATE SODIUM AND SENNOSIDES 1 TABLET: 8.6; 5 TABLET, FILM COATED ORAL at 08:03

## 2024-03-08 RX ADMIN — GLYCOPYRROLATE 0.2 MG: 0.2 INJECTION, SOLUTION INTRAMUSCULAR; INTRAVENOUS at 07:03

## 2024-03-08 RX ADMIN — SODIUM CHLORIDE: 0.9 INJECTION, SOLUTION INTRAVENOUS at 07:03

## 2024-03-08 RX ADMIN — FENTANYL CITRATE 50 MCG: 50 INJECTION, SOLUTION INTRAMUSCULAR; INTRAVENOUS at 07:03

## 2024-03-08 RX ADMIN — HYDROCHLOROTHIAZIDE 12.5 MG: 12.5 TABLET ORAL at 05:03

## 2024-03-08 RX ADMIN — SUCCINYLCHOLINE CHLORIDE 180 MG: 20 INJECTION, SOLUTION INTRAMUSCULAR; INTRAVENOUS at 07:03

## 2024-03-08 RX ADMIN — HYDROMORPHONE HYDROCHLORIDE 0.2 MG: 1 INJECTION, SOLUTION INTRAMUSCULAR; INTRAVENOUS; SUBCUTANEOUS at 12:03

## 2024-03-08 RX ADMIN — HYDROMORPHONE HYDROCHLORIDE 0.2 MG: 2 INJECTION INTRAMUSCULAR; INTRAVENOUS; SUBCUTANEOUS at 01:03

## 2024-03-08 RX ADMIN — HYDROCODONE BITARTRATE AND ACETAMINOPHEN 1 TABLET: 10; 325 TABLET ORAL at 07:03

## 2024-03-08 RX ADMIN — PROPOFOL 20 MG: 10 INJECTION, EMULSION INTRAVENOUS at 09:03

## 2024-03-08 RX ADMIN — HYDROMORPHONE HYDROCHLORIDE 0.2 MG: 1 INJECTION, SOLUTION INTRAMUSCULAR; INTRAVENOUS; SUBCUTANEOUS at 01:03

## 2024-03-08 RX ADMIN — KETAMINE HYDROCHLORIDE 5 MCG/KG/MIN: 50 INJECTION INTRAMUSCULAR; INTRAVENOUS at 07:03

## 2024-03-08 RX ADMIN — DEXTROSE MONOHYDRATE 1 G: 2.5 INJECTION INTRAVENOUS at 07:03

## 2024-03-08 RX ADMIN — LISINOPRIL 20 MG: 20 TABLET ORAL at 05:03

## 2024-03-08 RX ADMIN — PROPOFOL 150 MG: 10 INJECTION, EMULSION INTRAVENOUS at 07:03

## 2024-03-08 RX ADMIN — SODIUM CHLORIDE 0.5 MCG/KG/MIN: 9 INJECTION, SOLUTION INTRAVENOUS at 07:03

## 2024-03-08 RX ADMIN — PROPOFOL 30 MG: 10 INJECTION, EMULSION INTRAVENOUS at 09:03

## 2024-03-08 RX ADMIN — SODIUM CHLORIDE, SODIUM GLUCONATE, SODIUM ACETATE, POTASSIUM CHLORIDE, MAGNESIUM CHLORIDE, SODIUM PHOSPHATE, DIBASIC, AND POTASSIUM PHOSPHATE: .53; .5; .37; .037; .03; .012; .00082 INJECTION, SOLUTION INTRAVENOUS at 07:03

## 2024-03-08 RX ADMIN — CEFAZOLIN 2 G: 330 INJECTION, POWDER, FOR SOLUTION INTRAMUSCULAR; INTRAVENOUS at 08:03

## 2024-03-08 RX ADMIN — LIDOCAINE HYDROCHLORIDE 100 MG: 20 INJECTION INTRAVENOUS at 07:03

## 2024-03-08 RX ADMIN — PREGABALIN 75 MG: 75 CAPSULE ORAL at 08:03

## 2024-03-08 RX ADMIN — HYDROCODONE BITARTRATE AND ACETAMINOPHEN 1 TABLET: 10; 325 TABLET ORAL at 01:03

## 2024-03-08 NOTE — PROGRESS NOTES
@1413: Patient transported to 2nd floor X-ray by RN.    @1417: Patient x-ray started.    @1428: Patient x-ray completed and patient transported back to Room 911 by RN.    @1440: Patient arrived to room and care transferred to receiving RN.

## 2024-03-08 NOTE — ANESTHESIA PROCEDURE NOTES
Intubation    Date/Time: 3/8/2024 7:42 AM    Performed by: Gabriel Silva DO  Authorized by: Leanne Don MD    Intubation:     Induction:  Intravenous    Intubated:  Postinduction    Mask Ventilation:  Easy mask    Attempts:  1    Attempted By:  Resident anesthesiologist    Method of Intubation:  Direct    Blade:  Castillo 3    Laryngeal View Grade: Grade IIA - cords partially seen      Difficult Airway Encountered?: No      Complications:  None    Airway Device Size:  7.0    Style/Cuff Inflation:  Cuffed (inflated to minimal occlusive pressure)    Tube secured:  23    Placement Verified By:  Capnometry    Complicating Factors:  None    Findings Post-Intubation:  BS equal bilateral and atraumatic/condition of teeth unchanged

## 2024-03-08 NOTE — NURSING
Nurses Note -- 4 Eyes      3/7/2024   5:30 PM      Skin assessed during: Admit      [x] No Altered Skin Integrity Present    []Prevention Measures Documented      [] Yes- Altered Skin Integrity Present or Discovered   [] LDA Added if Not in Epic (Describe Wound)   [] New Altered Skin Integrity was Present on Admit and Documented in LDA   [] Wound Image Taken    Wound Care Consulted? No    Attending Nurse:  Vaishnavi Hurley RN/Staff Member:  Winnie

## 2024-03-08 NOTE — PROGRESS NOTES
[facility] - Neurosurgery ([facility])  Neurosurgery  Progress Note    Subjective:     History of Present Illness: Janae Fuller is a 39yo woman w/PMHx HTN referred to the ED by her PCP with concern for cervical myelopathy. She reports 2 months of worsening paresthesia that began in her L shoulder has progressively worsened to involve both upper and lower extremities. She has also developed weakness in her L hand which prevents her from holding objects or tying her shoes. Over the last week she has had difficulty ambulating and her family has noted she sometimes appears to lean to one side. She denies saddle anesthesia, bladder or bowel dysfunction.     Post-Op Info:  Procedure(s) (LRB):  DISCECTOMY, SPINE, CERVICAL, ANTERIOR APPROACH, WITH FUSION SPINEWAVE (N/A)   Day of Surgery   Interval History: 3/8: Proceed to OR today     Medications:  Continuous Infusions:  Scheduled Meds:   lisinopriL  20 mg Oral Daily    And    hydroCHLOROthiazide  12.5 mg Oral Daily    senna-docusate 8.6-50 mg  1 tablet Oral BID     PRN Meds:dextrose 10%, dextrose 10%, dextrose, dextrose, glucagon (human recombinant), HYDROcodone-acetaminophen, HYDROcodone-acetaminophen, methocarbamoL, polyethylene glycol     Review of Systems  Objective:     Weight: 104.4 kg (230 lb 2.6 oz)  Body mass index is 42.1 kg/m².  Vital Signs (Most Recent):  Temp: 99 °F (37.2 °C) (03/08/24 0620)  Pulse: 65 (03/08/24 0620)  Resp: 16 (03/08/24 0620)  BP: (!) 143/82 (03/08/24 0620)  SpO2: 100 % (03/08/24 0620) Vital Signs (24h Range):  Temp:  [97.7 °F (36.5 °C)-99 °F (37.2 °C)] 99 °F (37.2 °C)  Pulse:  [65-81] 65  Resp:  [16-18] 16  SpO2:  [96 %-100 %] 100 %  BP: (109-172)/(60-86) 143/82                                 Physical Exam         Neurosurgery Physical Exam  General: well developed, well nourished, no distress.   Head: normocephalic, atraumatic  Mental Status: Awake, Alert, Oriented  Speech: Clear with content appropriate to conversation  Cranial nerves: face  symmetric, CN II-XII grossly intact.   Sensory: Loss of sensation along L anterior thigh, otherwise intact to light touch throughout     Motor Strength:      Strength   Deltoids Triceps Biceps Wrist Extension Wrist Flexion Hand    Upper: R 5/5 5/5 5/5 5/5 5/5 5/5     L 4-/5 4/5 4/5 3/5 3/5 3/5       Iliopsoas Quadriceps Knee  Flexion Tibialis  anterior Gastro- cnemius EHL   Lower: R 5/5 5/5 5/5 5/5 5/5 5/5     L 4-/5 5/5 5/5 5/5 5/5 5/5      Crawford: Positive R Ty  Significant Labs:  Recent Labs   Lab 03/06/24  1504   GLU 96      K 4.0      CO2 24   BUN 8   CREATININE 0.9   CALCIUM 9.8     Recent Labs   Lab 03/06/24  1504   WBC 7.09   HGB 10.1*   HCT 31.1*        Recent Labs   Lab 03/07/24  1226   INR 1.1   APTT 25.8     Microbiology Results (last 7 days)       ** No results found for the last 168 hours. **          All pertinent labs from the last 24 hours have been reviewed.    Significant Diagnostics:    Assessment/Plan:     Cervical myelopathy  Janae Fuller is a 39yo woman w/PMHx HTN referred to the ED by her PCP with concern for cervical myelopathy. Patient with appreciable LUE weakness on exam.     - Neuro stable on exam  - MRI cervical spine with spondylosis throughout. Significant central canal stenosis due to disc extrusion, at C5-6 and worse at C6-7. To a lesser degree disc extrusion present at C3-4   - No dynamic instability appreciate on flex/ex cervical spine  - Pre op labs reviewed. Proceed to OR this AM as planned     Discussed with Dr. Sierra Lees PAAnnC  Neurosurgery  [facility] - Neurosurgery ([facility])

## 2024-03-08 NOTE — OP NOTE
[facility] - Surgery ([facility])  Neurosurgery  Operative Note    SUMMARY      Date of Procedure: 3/8/2024     Procedure: Procedure(s) (LRB):  DISCECTOMY, SPINE, CERVICAL, ANTERIOR APPROACH, WITH FUSION SPINEWAVE (N/A)     Surgeon(s) and Role:     * Shila Esquivel MD - Primary     * Hedy Parada MD - Resident - Assisting     * Nikolai Oliva MD - Co-Surgeon    Pre-Operative Diagnosis: Cervical herniated disc [M50.20]  Cervical myelopathy [G95.9]    Post-Operative Diagnosis: Post-Op Diagnosis Codes:     * Cervical herniated disc [M50.20]     * Cervical myelopathy [G95.9]    Anesthesia: General    Technical Procedures Used:   1. Placement of Martin-Wells tongs and traction   2. Anterior approach to the cervical spine   3. Discectomy with decompression of PLL, C6-C7  4. Placement of [id number] Z-link stand alone cage with 16mm screws (done primarily by me)   5. Discectomy with decompression of PLL, C5-C6  6. Placement of [id number] pro-disc cervical disc prosthesis (PDVMD5) (done primarily by Dr. Oliva)   7. Use of intraoperative microscope   8. Use of intraoperative neuromonitoring       Indications:   Janae Fuller is a 38 y.o. female who presented with progressive myelopathy since January, more acute for the past 4-5 days. I discussed with patient that her subacute decline is likely cervical myelopathy due to C6-C7>C5-C6 disc herniation. Risks include, but are not limited to, bleeding, pain, infection, scarring, need for further/repeat procedure, death, paralysis, damage to vocal cords, nerve root palsy, damage to esophagus, damage to trachea, stroke/damage to major blood vessels, leak of cerebrospinal fluid, and hardware-related complications. She is at increased risk of adjacent segment disease 2/2 degenerative change already evident at C4-C5. Because of that, we recommended fusing at C6-C7 and placing a disc replacement at C5-C6 to help reduce biomechanic stress for the adjacent levels.      Description of the Procedure:    The patient was brought back to the operating room, and taking care not to let her MAPs fall below 85 (with an awake A-line), general endotracheal anesthesia was induced by the anesthesia service. Her head was carefully positioned on the [place] head ta, and Martin-Wells tongs were placed. At this point SSEP and MEP baselines were obtained. After those baselines were obtained, 10 lbs of traction were applied. SSEPs and MEPs were re-checked and found to be stable after this positioning. We applied another 10 lbs of traction for a total of 20, and again, all signals were stable.  All pressure points were carefully padded. TEDs and SCDs were applied, a Aranda catheter was placed, and IV Ancef and dexamethasone were administered.      The C-arm was brought in to localize the vertebral body. The skin incision was marked parallel to the Alissa lines, and the patient was prepped and draped in the usual sterile fashion. 1% lidocaine with epinephrine was used to infiltrate the skin. A 15 blade was used to incise the skin, and dissection was carried down to the level of the platysma with Bovie cautery. The platysma was then elevated and divided. We then undermined the platysma in both rostral and caudal direction, using bipolar cautery at 20 as needed.      The medial border of the sternocleidomastoid was identified, and we dissected dorsally along its edge. The carotid was palpated and brought laterally. A Cloward retractor was used to gently retract the esophagus, and the spine was identified. A lateral X-ray confirmed our level. We next elevated the longus colli up bilaterally to be able to place the Trimline retractor blades. These were successfully inserted into the self-retaining retractor.      The disc was subsequently removed with a combination of straight and upgoing curettes. We placed [place] pins to be parallel with the disc spaces. We used a disc-space  to achieve even distraction with the  self-retainer. We visualized the uncovertebral joints bilaterally. Using a high-speed drill with an M8 bit, we carefully drilled the dorsalmost third of the inferior endplate of C6 and the superior endplate of C7. The PLL and extruded disc material was then gently dissected, layer by layer, using a combination of blunt nerve hook and Kerrison #1 rongeur. A series of thin-footplate upgoing curettes was used to free the additional disc material from behind the C6 and C7 bodies. We also felt out the bilateral foramina and found that the C7 nerve roots were completely free.      MEPs were evaluated and found to be stable. We then turned our attention to trialing the disc replacement inserts.  The 6mm device was a good fit and did not cause overdistraction of the facets per lateral X-ray. We thus proceeded with placing that implant with Friedheim distraction released. A high-speed drill was used to help seat the screws at C6 and C7. Motor evoked potentials were monitored at each phase and found to be stable. Lateral X-ray confirmed appropriate placement of the graft. The Friedheim pins were removed under direct visualization, and Surgiflo was applied to the pin sites for hemostasis.      We then moved to the C5-C6 level and repeated the exact same process. Here, Dr. Oliva placed a [id number] insert. MEPs were again found to be stable after the traction was released.      We then removed the self-retaining retractors and obtained meticulous hemostasis. We irrigated copiously with antibiotic-containing solution. Depo-medrol was applied to the esophagus, which was inspected and did not appear to be damaged. We placed a hemovac drain. We performed a two-layer closure with 3-0 Vicryl inverted stitches in the platysma and subcutaneous layers, with a running subcuticular 4-0 Monocryl for the skin with mastisol, steri-strips, and Telfa/tegaderm overlying.      Complications: No     Estimated Blood Loss (EBL): 10cc    This case warrants a  22 modifier 2/2 the patient's BMI 42, which made positioning, approach, the key portions of the procedure, and x-ray more difficult and time-consuming than normal.            Specimens:   Specimen (24h ago, onward)      None             Implants:   Implant Name Type Inv. Item Serial No.  Lot No. LRB No. Used Action   ACTIFUSE SHAPE SM CYL [id number] - SNA  ACTIFUSE SHAPE SM CYL 15X9MM NA [employer] [id number] N/A 1 Implanted   Prodisc, C Vivo   NA  2022-1337 N/A 1 Implanted and Explanted   Prodisc, C Vivo   NA  2023-0853 N/A 1 Implanted   6mm Z-Link Cervical Interbody Plate   NA  NA N/A 1 Implanted   12*15 Z-Link Cervical Spacer   NA  NA N/A 1 Implanted   Self Drilling Variable Screw   NA  NA N/A 2 Implanted              Condition: Stable    Disposition: PACU - hemodynamically stable.    Attestation:  I was present and scrubbed for the fusion portion of the case. A qualified resident was not available for the entire case.

## 2024-03-08 NOTE — ANESTHESIA POSTPROCEDURE EVALUATION
Anesthesia Post Evaluation    Patient: Janae Fuller    Procedure(s) Performed: Procedure(s) (LRB):  DISCECTOMY, SPINE, CERVICAL, ANTERIOR APPROACH, WITH FUSION SPINEWAVE (N/A)    Final Anesthesia Type: general      Patient location during evaluation: PACU  Patient participation: Yes- Able to Participate  Level of consciousness: awake and alert and oriented  Post-procedure vital signs: reviewed and stable  Pain management: adequate  Airway patency: patent    PONV status at discharge: No PONV  Anesthetic complications: no      Cardiovascular status: hemodynamically stable  Respiratory status: unassisted and spontaneous ventilation  Hydration status: euvolemic  Follow-up not needed.          Vitals Value Taken Time   /78 03/08/24 1401   Temp 36.6 °C (97.8 °F) 03/08/24 1306   Pulse 72 03/08/24 1408   Resp 22 03/08/24 1408   SpO2 96 % 03/08/24 1408   Vitals shown include unvalidated device data.      No case tracking events are documented in the log.      Pain/Elie Score: Pain Rating Prior to Med Admin: 5 (3/8/2024  1:45 PM)  Pain Rating Post Med Admin: 5 (3/8/2024  1:45 PM)  Elie Score: 10 (3/8/2024  1:45 PM)

## 2024-03-08 NOTE — ASSESSMENT & PLAN NOTE
Janae Fuller is a 37yo woman w/PMHx HTN referred to the ED by her PCP with concern for cervical myelopathy. Patient with appreciable LUE weakness on exam.     - Neuro stable on exam  - MRI cervical spine with spondylosis throughout. Significant central canal stenosis due to disc extrusion, at C5-6 and worse at C6-7. To a lesser degree disc extrusion present at C3-4   - No dynamic instability appreciate on flex/ex cervical spine  - Pre op labs reviewed. Proceed to OR this AM as planned     Discussed with Dr. Esquivel

## 2024-03-08 NOTE — NURSING
Patient received back to room 911.     Patient Aox4, VSS    Neuro: Decreased sensation on bottoms of feet, tingling present in lower back wrapping around to front of hip, and goes down leg ending in toes. Patient states decreased sensation in bottom of feet. Patient states tingling the same as before surgery. Patient can stand and move with little assistance. Dr. Sierra MD aware.    BUEs move anti gravity. Increased weakness, decreased dexterity, and flexion of hand in RUE. Patient denies numbness or tingling.     Eyes: PERRLA  Ears: WDL  Nose: WDL  Mouth: WDL  Throat: There is an anterrior incision covered by a dressing. A singular hemavac drain is in place set to full suction. Patient complains of pain to incision site. Patient swallows without difficulty, but has some issues clearing throat.     Respiratory: SpO2 97% on room air. Incentive spirometer placed at bedside. Bedside teaching done on how to use spirometer.     GI: Patient denies tenderness, or pain in any quadrant LBM stated 3/7/24    : Aranda removed before patient came to floor. Patient urinated spontaneously, without incontinence on bedside commode.    Skin: incision to anterior neck and hemavac drain. There is a 20g IV in Right hand, and 20g IV in the Right AC. No other altered skin integrity.

## 2024-03-08 NOTE — SUBJECTIVE & OBJECTIVE
Interval History: 3/8: Proceed to OR today     Medications:  Continuous Infusions:  Scheduled Meds:   lisinopriL  20 mg Oral Daily    And    hydroCHLOROthiazide  12.5 mg Oral Daily    senna-docusate 8.6-50 mg  1 tablet Oral BID     PRN Meds:dextrose 10%, dextrose 10%, dextrose, dextrose, glucagon (human recombinant), HYDROcodone-acetaminophen, HYDROcodone-acetaminophen, methocarbamoL, polyethylene glycol     Review of Systems  Objective:     Weight: 104.4 kg (230 lb 2.6 oz)  Body mass index is 42.1 kg/m².  Vital Signs (Most Recent):  Temp: 99 °F (37.2 °C) (03/08/24 0620)  Pulse: 65 (03/08/24 0620)  Resp: 16 (03/08/24 0620)  BP: (!) 143/82 (03/08/24 0620)  SpO2: 100 % (03/08/24 0620) Vital Signs (24h Range):  Temp:  [97.7 °F (36.5 °C)-99 °F (37.2 °C)] 99 °F (37.2 °C)  Pulse:  [65-81] 65  Resp:  [16-18] 16  SpO2:  [96 %-100 %] 100 %  BP: (109-172)/(60-86) 143/82                                 Physical Exam         Neurosurgery Physical Exam  General: well developed, well nourished, no distress.   Head: normocephalic, atraumatic  Mental Status: Awake, Alert, Oriented  Speech: Clear with content appropriate to conversation  Cranial nerves: face symmetric, CN II-XII grossly intact.   Sensory: Loss of sensation along L anterior thigh, otherwise intact to light touch throughout     Motor Strength:      Strength   Deltoids Triceps Biceps Wrist Extension Wrist Flexion Hand    Upper: R 5/5 5/5 5/5 5/5 5/5 5/5     L 4-/5 4/5 4/5 3/5 3/5 3/5       Iliopsoas Quadriceps Knee  Flexion Tibialis  anterior Gastro- cnemius EHL   Lower: R 5/5 5/5 5/5 5/5 5/5 5/5     L 4-/5 5/5 5/5 5/5 5/5 5/5      Crawford: Positive R Ty  Significant Labs:  Recent Labs   Lab 03/06/24  1504   GLU 96      K 4.0      CO2 24   BUN 8   CREATININE 0.9   CALCIUM 9.8     Recent Labs   Lab 03/06/24  1504   WBC 7.09   HGB 10.1*   HCT 31.1*        Recent Labs   Lab 03/07/24  1226   INR 1.1   APTT 25.8     Microbiology Results (last 7  days)       ** No results found for the last 168 hours. **          All pertinent labs from the last 24 hours have been reviewed.    Significant Diagnostics:

## 2024-03-08 NOTE — NURSING
Nurses Note -- 4 Eyes      3/8/2024   3:26 PM      Skin assessed during: Transfer      [] No Altered Skin Integrity Present    []Prevention Measures Documented      [x] Yes- Altered Skin Integrity Present or Discovered   [x] LDA Added if Not in Epic (Describe Wound)   [] New Altered Skin Integrity was Present on Admit and Documented in LDA   [] Wound Image Taken    Wound Care Consulted? No    Attending Nurse:  Roderick Hurley RN/Staff Member: Jaqueline

## 2024-03-08 NOTE — ANESTHESIA PROCEDURE NOTES
Arterial    Diagnosis: Cervical herniated disc    Patient location during procedure: done in OR    Staffing  Authorizing Provider: Leanne Don MD  Performing Provider: Gabriel Silva DO    Staffing  Performed by: Gabriel Silva DO  Authorized by: Leanne Don MD    Anesthesiologist was present at the time of the procedure.    Preanesthetic Checklist  Completed: patient identified, IV checked, site marked, risks and benefits discussed, surgical consent, monitors and equipment checked, pre-op evaluation, timeout performed and anesthesia consent givenArterial  Skin Prep: chlorhexidine gluconate  Orientation: right  Location: radial    Catheter Size: 18 G  Catheter placement by Ultrasound guidance. Heme positive aspiration all ports.   Vessel Caliber: medium, patent, compressibility normal  Needle advanced into vessel with real time Ultrasound guidance.Insertion Attempts: 1  Assessment  Dressing: secured with tape and tegaderm  Patient: Tolerated well

## 2024-03-08 NOTE — CARE UPDATE
I have reviewed the chart of Janae Fuller who is hospitalized for the following:    Active Hospital Problems    Diagnosis    *Cervical myelopathy    Hyponatremia     Monitor with labs      Severe obesity (BMI >= 40)     Severe obesityu       Sensory loss     worsening paresthesia that began in her L shoulder has progressively worsened to involve both upper and lower extremities.       Essential hypertension     lisinopril            Natalie Mijares NP  [facility]

## 2024-03-08 NOTE — TRANSFER OF CARE
"Anesthesia Transfer of Care Note    Patient: Janae Fuller    Procedure(s) Performed: Procedure(s) (LRB):  DISCECTOMY, SPINE, CERVICAL, ANTERIOR APPROACH, WITH FUSION SPINEWAVE (N/A)    Patient location: PACU    Anesthesia Type: general    Transport from OR: Transported from OR on 6-10 L/min O2 by face mask with adequate spontaneous ventilation    Post pain: pain needs to be addressed    Post assessment: no apparent anesthetic complications    Post vital signs: stable    Level of consciousness: awake and alert    Nausea/Vomiting: no nausea/vomiting    Complications: none    Transfer of care protocol was followed      Last vitals: Visit Vitals  /79 (BP Location: Right arm, Patient Position: Lying)   Pulse 73   Temp 36.6 °C (97.8 °F) (Temporal)   Resp 16   Ht 5' 2" (1.575 m)   Wt 104.4 kg (230 lb 2.6 oz)   SpO2 100%   Breastfeeding No   BMI 42.10 kg/m²     "

## 2024-03-08 NOTE — BRIEF OP NOTE
[facility] - Surgery ([facility])  Brief Operative Note    SUMMARY     Surgery Date: 3/8/2024     Surgeon(s) and Role:     * Shila Esquivel MD - Primary     * Hedy Parada MD - Resident - Assisting     * Nikolai Oliva MD - Co-Surgeon        Pre-op Diagnosis:  Cervical herniated disc [M50.20]  Cervical myelopathy [G95.9]    Post-op Diagnosis:  Post-Op Diagnosis Codes:     * Cervical herniated disc [M50.20]     * Cervical myelopathy [G95.9]    Procedure(s) (LRB):  DISCECTOMY, SPINE, CERVICAL, ANTERIOR APPROACH, WITH FUSION SPINEWAVE (N/A)    Anesthesia: General    Implants:  Implant Name Type Inv. Item Serial No.  Lot No. LRB No. Used Action   ACTIFUSE SHAPE SM CYL 15X9MM - SNA  ACTIFUSE SHAPE SM CYL 15X9MM NA [id number] N/A 1 Implanted   Prodisc, C Vivo   NA  2023-0853 N/A 1 Implanted   6mm Z-Link Cervical Interbody Plate   NA  NA N/A 1 Implanted   12*15 Z-Link Cervical Spacer   NA  NA N/A 1 Implanted   Self Drilling Variable Screw   NA  NA N/A 2 Implanted       Operative Findings: fluoro used for localization of appropriate levels, right sided anterior approach performed. large disc herniation at C6-7 removed and fusion performed at that level. Disc arthroplasty performed at C5-6 level. Neuromonitoring signals stable throughout.     Estimated Blood Loss: * No values recorded between 3/8/2024  8:40 AM and 3/8/2024 12:49 PM *    Estimated Blood Loss has been documented.30cc         Specimens:   Specimen (24h ago, onward)      None            [id number]

## 2024-03-08 NOTE — NURSING TRANSFER
Nursing Transfer Note      3/8/2024   1:56 PM    Nurse giving handoff:Jaret THOMPSON Rn  Nurse receiving handoff:Roderick Rn (Aiden RN student )    Reason patient is being transferred: [postop    Transfer To: 911    Transfer via stretcher    Transfer with n/a    Transported by transport    Transfer Vital Signs:  Blood Pressure:see flowsheet  Heart Rate:  O2:  Temperature:  Respirations:    Telemetry: n/a  Order for Tele Monitor? yes    Additional Lines: n/a        Medicines sent: n/a    Any special needs or follow-up needed:     Patient belongings transferred with patient: Yes, c collar    Chart send with patient: Yes    Notified:     Patient reassessed at: 3/8/24  1  Upon arrival to floor: bed in lowest position

## 2024-03-09 LAB
ANION GAP SERPL CALC-SCNC: 8 MMOL/L (ref 8–16)
BASOPHILS # BLD AUTO: 0.03 K/UL (ref 0–0.2)
BASOPHILS NFR BLD: 0.2 % (ref 0–1.9)
BUN SERPL-MCNC: 12 MG/DL (ref 6–20)
CALCIUM SERPL-MCNC: 9.3 MG/DL (ref 8.7–10.5)
CHLORIDE SERPL-SCNC: 106 MMOL/L (ref 95–110)
CO2 SERPL-SCNC: 22 MMOL/L (ref 23–29)
CREAT SERPL-MCNC: 0.8 MG/DL (ref 0.5–1.4)
DIFFERENTIAL METHOD BLD: ABNORMAL
EOSINOPHIL # BLD AUTO: 0 K/UL (ref 0–0.5)
EOSINOPHIL NFR BLD: 0.1 % (ref 0–8)
ERYTHROCYTE [DISTWIDTH] IN BLOOD BY AUTOMATED COUNT: 18.6 % (ref 11.5–14.5)
EST. GFR  (NO RACE VARIABLE): >60 ML/MIN/1.73 M^2
GLUCOSE SERPL-MCNC: 104 MG/DL (ref 70–110)
HCT VFR BLD AUTO: 29 % (ref 37–48.5)
HGB BLD-MCNC: 9.3 G/DL (ref 12–16)
IMM GRANULOCYTES # BLD AUTO: 0.06 K/UL (ref 0–0.04)
IMM GRANULOCYTES NFR BLD AUTO: 0.4 % (ref 0–0.5)
LYMPHOCYTES # BLD AUTO: 1.8 K/UL (ref 1–4.8)
LYMPHOCYTES NFR BLD: 11.1 % (ref 18–48)
MCH RBC QN AUTO: 26.4 PG (ref 27–31)
MCHC RBC AUTO-ENTMCNC: 32.1 G/DL (ref 32–36)
MCV RBC AUTO: 82 FL (ref 82–98)
MONOCYTES # BLD AUTO: 1.4 K/UL (ref 0.3–1)
MONOCYTES NFR BLD: 8.8 % (ref 4–15)
NEUTROPHILS # BLD AUTO: 12.6 K/UL (ref 1.8–7.7)
NEUTROPHILS NFR BLD: 79.4 % (ref 38–73)
NRBC BLD-RTO: 0 /100 WBC
PLATELET # BLD AUTO: 410 K/UL (ref 150–450)
PMV BLD AUTO: 10 FL (ref 9.2–12.9)
POTASSIUM SERPL-SCNC: 3.9 MMOL/L (ref 3.5–5.1)
RBC # BLD AUTO: 3.52 M/UL (ref 4–5.4)
SODIUM SERPL-SCNC: 136 MMOL/L (ref 136–145)
WBC # BLD AUTO: 15.88 K/UL (ref 3.9–12.7)

## 2024-03-09 PROCEDURE — 85025 COMPLETE CBC W/AUTO DIFF WBC: CPT | Performed by: PHYSICIAN ASSISTANT

## 2024-03-09 PROCEDURE — 25000003 PHARM REV CODE 250

## 2024-03-09 PROCEDURE — 63600175 PHARM REV CODE 636 W HCPCS

## 2024-03-09 PROCEDURE — 11000001 HC ACUTE MED/SURG PRIVATE ROOM

## 2024-03-09 PROCEDURE — 25000003 PHARM REV CODE 250: Performed by: PHYSICIAN ASSISTANT

## 2024-03-09 PROCEDURE — 97165 OT EVAL LOW COMPLEX 30 MIN: CPT

## 2024-03-09 PROCEDURE — 80048 BASIC METABOLIC PNL TOTAL CA: CPT | Performed by: PHYSICIAN ASSISTANT

## 2024-03-09 PROCEDURE — 97116 GAIT TRAINING THERAPY: CPT

## 2024-03-09 PROCEDURE — 97162 PT EVAL MOD COMPLEX 30 MIN: CPT

## 2024-03-09 PROCEDURE — 97530 THERAPEUTIC ACTIVITIES: CPT

## 2024-03-09 PROCEDURE — 97112 NEUROMUSCULAR REEDUCATION: CPT

## 2024-03-09 PROCEDURE — 36415 COLL VENOUS BLD VENIPUNCTURE: CPT | Performed by: PHYSICIAN ASSISTANT

## 2024-03-09 RX ADMIN — DOCUSATE SODIUM AND SENNOSIDES 1 TABLET: 8.6; 5 TABLET, FILM COATED ORAL at 09:03

## 2024-03-09 RX ADMIN — HYDROCHLOROTHIAZIDE 12.5 MG: 12.5 TABLET ORAL at 09:03

## 2024-03-09 RX ADMIN — LISINOPRIL 20 MG: 20 TABLET ORAL at 09:03

## 2024-03-09 RX ADMIN — HYDROCODONE BITARTRATE AND ACETAMINOPHEN 1 TABLET: 10; 325 TABLET ORAL at 02:03

## 2024-03-09 RX ADMIN — HYDROCODONE BITARTRATE AND ACETAMINOPHEN 1 TABLET: 10; 325 TABLET ORAL at 09:03

## 2024-03-09 RX ADMIN — PREGABALIN 75 MG: 75 CAPSULE ORAL at 09:03

## 2024-03-09 RX ADMIN — METHOCARBAMOL 500 MG: 500 TABLET ORAL at 08:03

## 2024-03-09 RX ADMIN — PREGABALIN 75 MG: 75 CAPSULE ORAL at 08:03

## 2024-03-09 RX ADMIN — DEXTROSE MONOHYDRATE 1 G: 2.5 INJECTION INTRAVENOUS at 04:03

## 2024-03-09 RX ADMIN — DEXTROSE MONOHYDRATE 1 G: 2.5 INJECTION INTRAVENOUS at 12:03

## 2024-03-09 RX ADMIN — DEXTROSE MONOHYDRATE 1 G: 2.5 INJECTION INTRAVENOUS at 08:03

## 2024-03-09 RX ADMIN — HYDROCODONE BITARTRATE AND ACETAMINOPHEN 1 TABLET: 10; 325 TABLET ORAL at 08:03

## 2024-03-09 RX ADMIN — DOCUSATE SODIUM AND SENNOSIDES 1 TABLET: 8.6; 5 TABLET, FILM COATED ORAL at 08:03

## 2024-03-09 NOTE — PLAN OF CARE
[facility] - Neurosurgery (Hospital)  Initial Discharge Assessment       Primary Care Provider: Ban Mckeon NP    Admission Diagnosis: Neck pain [M54.2]  Cervical myelopathy [G95.9]  Cervical stenosis of spinal canal [M48.02]  Cervical herniated disc [M50.20]    Admission Date: 3/6/2024  Expected Discharge Date: 3/11/2024    Transition of Care Barriers: (P) Underinsured    Payor: MEDICAID / Plan: [facility] CONNECT / Product Type: Managed Medicaid /     Extended Emergency Contact Information  Primary Emergency Contact: Simeon Lopez  Mobile Phone: [phone]  Relation: Mother  Secondary Emergency Contact: Mario Wallace  Mobile Phone: [phone]  Relation: Significant other    Discharge Plan A: (P) Home with family  Discharge Plan B: (P) Home      [facility] [address]  [address]   [facility] [address]  Phone: [phone] Fax: [phone]    [id number] [address] AT [address] [address]  Phone: [phone] Fax: [phone]      Initial Assessment (most recent)       Adult Discharge Assessment - 03/09/24 1245          Discharge Assessment    Assessment Type Discharge Planning Assessment (P)      Confirmed/corrected address, phone number and insurance Yes (P)      Confirmed Demographics Correct on Facesheet (P)      Source of Information patient (P)      Communicated DALI with patient/caregiver Date not available/Unable to determine (P)      Reason For Admission Cervical myelopathy (P)      People in Home child(scar), dependent;parent(s) (P)      Facility Arrived From: Home (P)      Do you expect to return to your current living situation? Yes (P)      Do you have help at home or someone to help you manage your care at home? Yes (P)      Who are your caregiver(s) and their phone number(s)? Mother Simeon Lopez [phone] (P)      Prior to hospitilization cognitive status: Alert/Oriented (P)       Current cognitive status: Alert/Oriented (P)      Walking or Climbing Stairs Difficulty no (P)      Dressing/Bathing Difficulty no (P)      Home Accessibility wheelchair accessible (P)      Home Layout Able to live on 1st floor (P)      Equipment Currently Used at Home none (P)      Readmission within 30 days? No (P)      Patient currently being followed by outpatient case management? No (P)      Do you currently have service(s) that help you manage your care at home? No (P)      Do you take prescription medications? Yes (P)      Do you have prescription coverage? Yes (P)      Coverage Medicaid (P)      Do you have any problems affording any of your prescribed medications? TBD (P)      Is the patient taking medications as prescribed? yes (P)      Who is going to help you get home at discharge? Mother Simeon Lopez [phone] (P)      How do you get to doctors appointments? car, drives self;family or friend will provide (P)      Are you on dialysis? No (P)      Do you take coumadin? No (P)      Discharge Plan A Home with family (P)      Discharge Plan B Home (P)      DME Needed Upon Discharge  none (P)      Discharge Plan discussed with: Patient (P)      Transition of Care Barriers Underinsured (P)         Physical Activity    On average, how many days per week do you engage in moderate to strenuous exercise (like a brisk walk)? 0 days (P)      On average, how many minutes do you engage in exercise at this level? 0 min (P)         Financial Resource Strain    How hard is it for you to pay for the very basics like food, housing, medical care, and heating? Not very hard (P)         Housing Stability    In the last 12 months, was there a time when you were not able to pay the mortgage or rent on time? No (P)      In the last 12 months, how many places have you lived? 1 (P)      In the last 12 months, was there a time when you did not have a steady place to sleep or slept in a shelter (including now)? No (P)          Transportation Needs    In the past 12 months, has lack of transportation kept you from medical appointments or from getting medications? No (P)      In the past 12 months, has lack of transportation kept you from meetings, work, or from getting things needed for daily living? No (P)         Food Insecurity    Within the past 12 months, you worried that your food would run out before you got the money to buy more. Never true (P)      Within the past 12 months, the food you bought just didn't last and you didn't have money to get more. Never true (P)         Stress    Do you feel stress - tense, restless, nervous, or anxious, or unable to sleep at night because your mind is troubled all the time - these days? Only a little (P)         Social Connections    In a typical week, how many times do you talk on the phone with family, friends, or neighbors? Three times a week (P)      How often do you get together with friends or relatives? Three times a week (P)      How often do you attend Gnosticism or Sabianist services? More than 4 times per year (P)      Do you belong to any clubs or organizations such as Gnosticism groups, unions, fraternal or athletic groups, or school groups? No (P)      How often do you attend meetings of the clubs or organizations you belong to? Never (P)      Are you , , , , never , or living with a partner? Never  (P)         Alcohol Use    Q1: How often do you have a drink containing alcohol? Monthly or less (P)      Q2: How many drinks containing alcohol do you have on a typical day when you are drinking? 1 or 2 (P)      Q3: How often do you have six or more drinks on one occasion? Less than monthly (P)         OTHER    Name(s) of People in Home Mother Simeon Lopez [phone] (P)                    Discharge Plan A and Plan B have been determined by review of patient's clinical status, future medical and therapeutic needs, and coverage/benefits for  post-acute care in coordination with multidisciplinary team members.    Pt lives in a one story home with 1 DIANN. Pt was independent with ADL's prior to admission and uses no DME, is not on dialysis or Coumadin. Pt has transportation home with family. SW will follow for all discharge planning needs.     BEATRIZ Whatley, BERNABE  [facility]  [id number]

## 2024-03-09 NOTE — ASSESSMENT & PLAN NOTE
Janae Fuller is a 39yo woman w/PMHx HTN referred to the ED by her PCP with concern for cervical myelopathy. Patient with appreciable LUE weakness on exam.     - Neuro stable on exam  - MRI cervical spine with spondylosis throughout. Significant central canal stenosis due to disc extrusion, at C5-6 and worse at C6-7. To a lesser degree disc extrusion present at C3-4   - No dynamic instability appreciate on flex/ex cervical spine  - POD1 doing well with improved strength.   - Work with PT/OT    Discussed with Dr. Esquivel

## 2024-03-09 NOTE — SUBJECTIVE & OBJECTIVE
Interval History: NAEON. Pt exam improved this morning. Drain had minimal output. Pt in Cervical Collar    Medications:  Continuous Infusions:  Scheduled Meds:   ceFAZolin (Ancef) IV (PEDS and ADULTS)  1 g Intravenous Q8H    lisinopriL  20 mg Oral Daily    And    hydroCHLOROthiazide  12.5 mg Oral Daily    pregabalin  75 mg Oral BID    senna-docusate 8.6-50 mg  1 tablet Oral BID     PRN Meds:dextrose 10%, dextrose 10%, dextrose, dextrose, glucagon (human recombinant), HYDROcodone-acetaminophen, HYDROcodone-acetaminophen, methocarbamoL, morphine, polyethylene glycol     Review of Systems  Objective:     Weight: 104.4 kg (230 lb 2.6 oz)  Body mass index is 42.1 kg/m².  Vital Signs (Most Recent):  Temp: 98.1 °F (36.7 °C) (03/09/24 0734)  Pulse: 65 (03/09/24 0734)  Resp: 18 (03/09/24 0734)  BP: (!) 140/87 (03/09/24 0734)  SpO2: 95 % (03/09/24 0734) Vital Signs (24h Range):  Temp:  [97.8 °F (36.6 °C)-99.4 °F (37.4 °C)] 98.1 °F (36.7 °C)  Pulse:  [] 65  Resp:  [16-21] 18  SpO2:  [94 %-100 %] 95 %  BP: (130-181)/(67-87) 140/87                              Closed/Suction Drain 03/08/24 1200 Tube - 1 Anterior;Right Neck Accordion 10 Fr. (Active)   Site Description Unable to view 03/08/24 1600   Dressing Type Gauze;Transparent (Tegaderm) 03/08/24 1600   Dressing Status Clean;Dry;Intact 03/08/24 1600   Dressing Intervention Integrity maintained 03/08/24 1600   Drainage Bloody 03/08/24 1600   Status Other (Comment) 03/08/24 1600   Output (mL) 0 mL 03/09/24 0613          Physical Exam         Neurosurgery Physical Exam  General: well developed, well nourished, no distress.   Head: normocephalic, atraumatic  Mental Status: Awake, Alert, Oriented  Speech: Clear with content appropriate to conversation  Cranial nerves: face symmetric, CN II-XII grossly intact.   Sensory: Loss of sensation along L anterior thigh, otherwise intact to light touch throughout     Motor Strength:      Strength   Deltoids Triceps Biceps Wrist  Extension Wrist Flexion Hand    Upper: R 5/5 5/5 5/5 5/5 5/5 5/5     L 5/5 5/5 4+/5 4+/5 4+/5 5/5       Iliopsoas Quadriceps Knee  Flexion Tibialis  anterior Gastro- cnemius EHL   Lower: R 3/5 5/5 5/5 5/5 5/5 5/5     L 5/5 5/5 5/5 5/5 5/5 5/5      Ty: Positive R Ty  Significant Labs:  Recent Labs   Lab 03/08/24  0545 03/09/24  0439    104   * 136   K 3.6 3.9    106   CO2 21* 22*   BUN 13 12   CREATININE 0.9 0.8   CALCIUM 9.6 9.3     Recent Labs   Lab 03/08/24  0545 03/09/24  0439   WBC 5.87 15.88*   HGB 9.2* 9.3*   HCT 29.1* 29.0*    410     Recent Labs   Lab 03/07/24  1226   INR 1.1   APTT 25.8     Microbiology Results (last 7 days)       ** No results found for the last 168 hours. **          All pertinent labs from the last 24 hours have been reviewed.    Significant Diagnostics:  I have reviewed all pertinent imaging results/findings within the past 24 hours.

## 2024-03-09 NOTE — PLAN OF CARE
Eval completed; POC established    Problem: Physical Therapy  Goal: Physical Therapy Goal  Description: Goals to be met by: 3/30/24     Patient will increase functional independence with mobility by performin. Supine to sit with Modified [place]  2. Sit to stand transfer with Modified [place]  3. Bed to chair transfer with Modified [place] using LRAD  4. Gait  x 250 feet with Supervision using LRAD.     Outcome: Ongoing, Progressing

## 2024-03-09 NOTE — PLAN OF CARE
Problem: Occupational Therapy  Goal: Occupational Therapy Goal  Description: Goals to be met by: 3/23/24     Patient will increase functional independence with ADLs by performing:    LE Dressing with Supervision.  Grooming while standing at sink with Supervision.  Toileting from toilet with Supervision for hygiene and clothing management.   Toilet transfer to toilet with Supervision.    Outcome: Ongoing, Progressing
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
## 2024-03-09 NOTE — PLAN OF CARE
Problem: Adult Inpatient Plan of Care  Goal: Plan of Care Review  Outcome: Ongoing, Progressing  Goal: Patient-Specific Goal (Individualized)  Outcome: Ongoing, Progressing  Goal: Absence of Hospital-Acquired Illness or Injury  Outcome: Ongoing, Progressing  Goal: Optimal Comfort and Wellbeing  Outcome: Ongoing, Progressing  Goal: Readiness for Transition of Care  Outcome: Ongoing, Progressing     Problem: Infection  Goal: Absence of Infection Signs and Symptoms  Outcome: Ongoing, Progressing  Intervention: Prevent or Manage Infection  Flowsheets (Taken 3/8/2024 1814)  Infection Management: aseptic technique maintained     Problem: Pain Acute  Goal: Acceptable Pain Control and Functional Ability  Outcome: Ongoing, Progressing  Intervention: Develop Pain Management Plan  Flowsheets (Taken 3/8/2024 1814)  Pain Management Interventions:   around-the-clock dosing utilized   medication offered   premedicated for activity  Intervention: Prevent or Manage Pain  Flowsheets (Taken 3/8/2024 1814)  Sensory Stimulation Regulation: care clustered  Bowel Elimination Promotion: ambulation promoted  Medication Review/Management: medications reviewed  Intervention: Optimize Psychosocial Wellbeing  Flowsheets (Taken 3/8/2024 1814)  Spiritual Activities Assistance: affirmation provided  Supportive Measures:   active listening utilized   decision-making supported     Problem: Fall Injury Risk  Goal: Absence of Fall and Fall-Related Injury  Outcome: Ongoing, Progressing  Intervention: Identify and Manage Contributors  Flowsheets (Taken 3/8/2024 1814)  Self-Care Promotion: independence encouraged  Medication Review/Management: medications reviewed  Intervention: Promote Injury-Free Environment  Flowsheets (Taken 3/8/2024 1814)  Safety Promotion/Fall Prevention:   assistive device/personal item within reach   Fall Risk signage in place   bedside commode chair   side rails raised x 2   side rails raised x 3   supervised activity

## 2024-03-09 NOTE — PROGRESS NOTES
[facility] - Neurosurgery ([facility])  Neurosurgery  Progress Note    Subjective:     History of Present Illness: Janae Fuller is a 37yo woman w/PMHx HTN referred to the ED by her PCP with concern for cervical myelopathy. She reports 2 months of worsening paresthesia that began in her L shoulder has progressively worsened to involve both upper and lower extremities. She has also developed weakness in her L hand which prevents her from holding objects or tying her shoes. Over the last week she has had difficulty ambulating and her family has noted she sometimes appears to lean to one side. She denies saddle anesthesia, bladder or bowel dysfunction.     Post-Op Info:  Procedure(s) (LRB):  DISCECTOMY, SPINE, CERVICAL, ANTERIOR APPROACH, WITH FUSION SPINEWAVE (N/A)   1 Day Post-Op   Interval History: NAEON. Pt exam improved this morning. Drain had minimal output. Pt in Cervical Collar    Medications:  Continuous Infusions:  Scheduled Meds:   ceFAZolin (Ancef) IV (PEDS and ADULTS)  1 g Intravenous Q8H    lisinopriL  20 mg Oral Daily    And    hydroCHLOROthiazide  12.5 mg Oral Daily    pregabalin  75 mg Oral BID    senna-docusate 8.6-50 mg  1 tablet Oral BID     PRN Meds:dextrose 10%, dextrose 10%, dextrose, dextrose, glucagon (human recombinant), HYDROcodone-acetaminophen, HYDROcodone-acetaminophen, methocarbamoL, morphine, polyethylene glycol     Review of Systems  Objective:     Weight: 104.4 kg (230 lb 2.6 oz)  Body mass index is 42.1 kg/m².  Vital Signs (Most Recent):  Temp: 98.1 °F (36.7 °C) (03/09/24 0734)  Pulse: 65 (03/09/24 0734)  Resp: 18 (03/09/24 0734)  BP: (!) 140/87 (03/09/24 0734)  SpO2: 95 % (03/09/24 0734) Vital Signs (24h Range):  Temp:  [97.8 °F (36.6 °C)-99.4 °F (37.4 °C)] 98.1 °F (36.7 °C)  Pulse:  [] 65  Resp:  [16-21] 18  SpO2:  [94 %-100 %] 95 %  BP: (130-181)/(67-87) 140/87                              Closed/Suction Drain 03/08/24 1200 Tube - 1 Anterior;Right Neck Accordion 10 Fr. (Active)    Site Description Unable to view 03/08/24 1600   Dressing Type Gauze;Transparent (Tegaderm) 03/08/24 1600   Dressing Status Clean;Dry;Intact 03/08/24 1600   Dressing Intervention Integrity maintained 03/08/24 1600   Drainage Bloody 03/08/24 1600   Status Other (Comment) 03/08/24 1600   Output (mL) 0 mL 03/09/24 0613          Physical Exam         Neurosurgery Physical Exam  General: well developed, well nourished, no distress.   Head: normocephalic, atraumatic  Mental Status: Awake, Alert, Oriented  Speech: Clear with content appropriate to conversation  Cranial nerves: face symmetric, CN II-XII grossly intact.   Sensory: Loss of sensation along L anterior thigh, otherwise intact to light touch throughout     Motor Strength:      Strength   Deltoids Triceps Biceps Wrist Extension Wrist Flexion Hand    Upper: R 5/5 5/5 5/5 5/5 5/5 5/5     L 5/5 5/5 4+/5 4+/5 4+/5 5/5       Iliopsoas Quadriceps Knee  Flexion Tibialis  anterior Gastro- cnemius EHL   Lower: R 3/5 5/5 5/5 5/5 5/5 5/5     L 5/5 5/5 5/5 5/5 5/5 5/5      Crawford: Positive R Ty  Significant Labs:  Recent Labs   Lab 03/08/24  0545 03/09/24  0439    104   * 136   K 3.6 3.9    106   CO2 21* 22*   BUN 13 12   CREATININE 0.9 0.8   CALCIUM 9.6 9.3     Recent Labs   Lab 03/08/24  0545 03/09/24  0439   WBC 5.87 15.88*   HGB 9.2* 9.3*   HCT 29.1* 29.0*    410     Recent Labs   Lab 03/07/24  1226   INR 1.1   APTT 25.8     Microbiology Results (last 7 days)       ** No results found for the last 168 hours. **          All pertinent labs from the last 24 hours have been reviewed.    Significant Diagnostics:  I have reviewed all pertinent imaging results/findings within the past 24 hours.  Assessment/Plan:     * Cervical myelopathy  Janae Fuller is a 39yo woman w/PMHx HTN referred to the ED by her PCP with concern for cervical myelopathy. Patient with appreciable LUE weakness on exam.     - Neuro stable on exam  - MRI cervical spine  with spondylosis throughout. Significant central canal stenosis due to disc extrusion, at C5-6 and worse at C6-7. To a lesser degree disc extrusion present at C3-4   - No dynamic instability appreciate on flex/ex cervical spine  - POD1 doing well with improved strength.   - Work with PT/OT    Discussed with Dr. Sierra Martinez MD  Neurosurgery  [facility] - Neurosurgery ([facility])

## 2024-03-10 LAB
ANION GAP SERPL CALC-SCNC: 7 MMOL/L (ref 8–16)
BASOPHILS # BLD AUTO: 0.02 K/UL (ref 0–0.2)
BASOPHILS NFR BLD: 0.2 % (ref 0–1.9)
BUN SERPL-MCNC: 11 MG/DL (ref 6–20)
CALCIUM SERPL-MCNC: 8.8 MG/DL (ref 8.7–10.5)
CHLORIDE SERPL-SCNC: 105 MMOL/L (ref 95–110)
CO2 SERPL-SCNC: 25 MMOL/L (ref 23–29)
CREAT SERPL-MCNC: 0.7 MG/DL (ref 0.5–1.4)
DIFFERENTIAL METHOD BLD: ABNORMAL
EOSINOPHIL # BLD AUTO: 0.1 K/UL (ref 0–0.5)
EOSINOPHIL NFR BLD: 0.6 % (ref 0–8)
ERYTHROCYTE [DISTWIDTH] IN BLOOD BY AUTOMATED COUNT: 19 % (ref 11.5–14.5)
EST. GFR  (NO RACE VARIABLE): >60 ML/MIN/1.73 M^2
GLUCOSE SERPL-MCNC: 91 MG/DL (ref 70–110)
HCT VFR BLD AUTO: 27.8 % (ref 37–48.5)
HGB BLD-MCNC: 8.8 G/DL (ref 12–16)
IMM GRANULOCYTES # BLD AUTO: 0.03 K/UL (ref 0–0.04)
IMM GRANULOCYTES NFR BLD AUTO: 0.3 % (ref 0–0.5)
LYMPHOCYTES # BLD AUTO: 2.3 K/UL (ref 1–4.8)
LYMPHOCYTES NFR BLD: 25.4 % (ref 18–48)
MCH RBC QN AUTO: 26.3 PG (ref 27–31)
MCHC RBC AUTO-ENTMCNC: 31.7 G/DL (ref 32–36)
MCV RBC AUTO: 83 FL (ref 82–98)
MONOCYTES # BLD AUTO: 0.9 K/UL (ref 0.3–1)
MONOCYTES NFR BLD: 10.4 % (ref 4–15)
NEUTROPHILS # BLD AUTO: 5.6 K/UL (ref 1.8–7.7)
NEUTROPHILS NFR BLD: 63.1 % (ref 38–73)
NRBC BLD-RTO: 0 /100 WBC
PLATELET # BLD AUTO: 365 K/UL (ref 150–450)
PMV BLD AUTO: 10.4 FL (ref 9.2–12.9)
POTASSIUM SERPL-SCNC: 3.7 MMOL/L (ref 3.5–5.1)
RBC # BLD AUTO: 3.35 M/UL (ref 4–5.4)
SODIUM SERPL-SCNC: 137 MMOL/L (ref 136–145)
WBC # BLD AUTO: 8.87 K/UL (ref 3.9–12.7)

## 2024-03-10 PROCEDURE — 85025 COMPLETE CBC W/AUTO DIFF WBC: CPT | Performed by: PHYSICIAN ASSISTANT

## 2024-03-10 PROCEDURE — 80048 BASIC METABOLIC PNL TOTAL CA: CPT | Performed by: PHYSICIAN ASSISTANT

## 2024-03-10 PROCEDURE — 36415 COLL VENOUS BLD VENIPUNCTURE: CPT | Performed by: PHYSICIAN ASSISTANT

## 2024-03-10 PROCEDURE — 25000003 PHARM REV CODE 250

## 2024-03-10 PROCEDURE — 94761 N-INVAS EAR/PLS OXIMETRY MLT: CPT

## 2024-03-10 PROCEDURE — 11000001 HC ACUTE MED/SURG PRIVATE ROOM

## 2024-03-10 PROCEDURE — 63600175 PHARM REV CODE 636 W HCPCS

## 2024-03-10 PROCEDURE — 25000003 PHARM REV CODE 250: Performed by: PHYSICIAN ASSISTANT

## 2024-03-10 RX ORDER — ADHESIVE BANDAGE
30 BANDAGE TOPICAL DAILY PRN
Status: DISCONTINUED | OUTPATIENT
Start: 2024-03-10 | End: 2024-03-11 | Stop reason: HOSPADM

## 2024-03-10 RX ADMIN — PREGABALIN 75 MG: 75 CAPSULE ORAL at 08:03

## 2024-03-10 RX ADMIN — HYDROCHLOROTHIAZIDE 12.5 MG: 12.5 TABLET ORAL at 08:03

## 2024-03-10 RX ADMIN — HYDROCODONE BITARTRATE AND ACETAMINOPHEN 1 TABLET: 10; 325 TABLET ORAL at 06:03

## 2024-03-10 RX ADMIN — DEXTROSE MONOHYDRATE 1 G: 2.5 INJECTION INTRAVENOUS at 12:03

## 2024-03-10 RX ADMIN — HYDROCODONE BITARTRATE AND ACETAMINOPHEN 1 TABLET: 5; 325 TABLET ORAL at 08:03

## 2024-03-10 RX ADMIN — DOCUSATE SODIUM AND SENNOSIDES 1 TABLET: 8.6; 5 TABLET, FILM COATED ORAL at 08:03

## 2024-03-10 RX ADMIN — METHOCARBAMOL 500 MG: 500 TABLET ORAL at 08:03

## 2024-03-10 RX ADMIN — DEXTROSE MONOHYDRATE 1 G: 2.5 INJECTION INTRAVENOUS at 08:03

## 2024-03-10 RX ADMIN — LISINOPRIL 20 MG: 20 TABLET ORAL at 08:03

## 2024-03-10 RX ADMIN — DEXTROSE MONOHYDRATE 1 G: 2.5 INJECTION INTRAVENOUS at 05:03

## 2024-03-10 NOTE — PROGRESS NOTES
[facility] Conrad - Neurosurgery ([facility])  Neurosurgery  Progress Note    Subjective:     History of Present Illness: Janae Fuller is a 37yo woman w/PMHx HTN referred to the ED by her PCP with concern for cervical myelopathy. She reports 2 months of worsening paresthesia that began in her L shoulder has progressively worsened to involve both upper and lower extremities. She has also developed weakness in her L hand which prevents her from holding objects or tying her shoes. Over the last week she has had difficulty ambulating and her family has noted she sometimes appears to lean to one side. She denies saddle anesthesia, bladder or bowel dysfunction.     Post-Op Info:  Procedure(s) (LRB):  DISCECTOMY, SPINE, CERVICAL, ANTERIOR APPROACH, WITH FUSION SPINEWAVE (N/A)   2 Days Post-Op   Interval History: NAEON. Pt exam stable this morning. Planning for Home Monday vs IPR. Continue working with ptot. Now BM movement yet will add to bowel reg.    Medications:  Continuous Infusions:  Scheduled Meds:   ceFAZolin (Ancef) IV (PEDS and ADULTS)  1 g Intravenous Q8H    lisinopriL  20 mg Oral Daily    And    hydroCHLOROthiazide  12.5 mg Oral Daily    pregabalin  75 mg Oral BID    senna-docusate 8.6-50 mg  1 tablet Oral BID     PRN Meds:dextrose 10%, dextrose 10%, dextrose, dextrose, glucagon (human recombinant), HYDROcodone-acetaminophen, HYDROcodone-acetaminophen, methocarbamoL, morphine, polyethylene glycol     Review of Systems  Objective:     Weight: 104.4 kg (230 lb 2.6 oz)  Body mass index is 42.1 kg/m².  Vital Signs (Most Recent):  Temp: 98.6 °F (37 °C) (03/10/24 1136)  Pulse: 66 (03/10/24 1136)  Resp: 18 (03/10/24 1136)  BP: (!) 111/58 (03/10/24 1136)  SpO2: 97 % (03/10/24 1136) Vital Signs (24h Range):  Temp:  [97.8 °F (36.6 °C)-99.1 °F (37.3 °C)] 98.6 °F (37 °C)  Pulse:  [63-66] 66  Resp:  [18-20] 18  SpO2:  [95 %-100 %] 97 %  BP: (108-163)/(58-94) 111/58                                 Physical Exam         Neurosurgery  "Physical Exam  General: well developed, well nourished, no distress.   Head: normocephalic, atraumatic  Mental Status: Awake, Alert, Oriented  Speech: Clear with content appropriate to conversation  Cranial nerves: face symmetric, CN II-XII grossly intact.   Sensory: Loss of sensation along L anterior thigh, otherwise intact to light touch throughout     Motor Strength:      Strength   Deltoids Triceps Biceps Wrist Extension Wrist Flexion Hand    Upper: R 5/5 5/5 5/5 5/5 5/5 5/5     L 5/5 5/5 4+/5 4+/5 4+/5 5/5       Iliopsoas Quadriceps Knee  Flexion Tibialis  anterior Gastro- cnemius EHL   Lower: R 3/5 5/5 5/5 5/5 5/5 5/5     L 5/5 5/5 5/5 5/5 5/5 5/5      Crawford: Positive R Ty  Significant Labs:  Recent Labs   Lab 03/09/24  0439 03/10/24  0418    91    137   K 3.9 3.7    105   CO2 22* 25   BUN 12 11   CREATININE 0.8 0.7   CALCIUM 9.3 8.8     Recent Labs   Lab 03/09/24  0439 03/10/24  0418   WBC 15.88* 8.87   HGB 9.3* 8.8*   HCT 29.0* 27.8*    365     No results for input(s): "LABPT", "INR", "APTT" in the last 48 hours.  Microbiology Results (last 7 days)       ** No results found for the last 168 hours. **          All pertinent labs from the last 24 hours have been reviewed.    Significant Diagnostics:  I have reviewed all pertinent imaging results/findings within the past 24 hours.  Assessment/Plan:     * Cervical myelopathy  Janae Fuller is a 37yo woman w/PMHx HTN referred to the ED by her PCP with concern for cervical myelopathy. Patient with appreciable LUE weakness on exam.     - Neuro stable on exam  - MRI cervical spine with spondylosis throughout. Significant central canal stenosis due to disc extrusion, at C5-6 and worse at C6-7. To a lesser degree disc extrusion present at C3-4   - No dynamic instability appreciate on flex/ex cervical spine  - POD2 doing well with improved strength.   - Work with PT/OT            Festus Martinez MD  Neurosurgery  [facility] - " Neurosurgery ([facility])

## 2024-03-10 NOTE — ASSESSMENT & PLAN NOTE
Janae Fuller is a 37yo woman w/PMHx HTN referred to the ED by her PCP with concern for cervical myelopathy. Patient with appreciable LUE weakness on exam.     - Neuro stable on exam  - MRI cervical spine with spondylosis throughout. Significant central canal stenosis due to disc extrusion, at C5-6 and worse at C6-7. To a lesser degree disc extrusion present at C3-4   - No dynamic instability appreciate on flex/ex cervical spine  - POD2 doing well with improved strength.   - Work with PT/OT

## 2024-03-10 NOTE — SUBJECTIVE & OBJECTIVE
Interval History: NAEON. Pt exam stable this morning. Planning for Home Monday vs IPR. Continue working with ptot. Now BM movement yet will add to bowel reg.    Medications:  Continuous Infusions:  Scheduled Meds:   ceFAZolin (Ancef) IV (PEDS and ADULTS)  1 g Intravenous Q8H    lisinopriL  20 mg Oral Daily    And    hydroCHLOROthiazide  12.5 mg Oral Daily    pregabalin  75 mg Oral BID    senna-docusate 8.6-50 mg  1 tablet Oral BID     PRN Meds:dextrose 10%, dextrose 10%, dextrose, dextrose, glucagon (human recombinant), HYDROcodone-acetaminophen, HYDROcodone-acetaminophen, methocarbamoL, morphine, polyethylene glycol     Review of Systems  Objective:     Weight: 104.4 kg (230 lb 2.6 oz)  Body mass index is 42.1 kg/m².  Vital Signs (Most Recent):  Temp: 98.6 °F (37 °C) (03/10/24 1136)  Pulse: 66 (03/10/24 1136)  Resp: 18 (03/10/24 1136)  BP: (!) 111/58 (03/10/24 1136)  SpO2: 97 % (03/10/24 1136) Vital Signs (24h Range):  Temp:  [97.8 °F (36.6 °C)-99.1 °F (37.3 °C)] 98.6 °F (37 °C)  Pulse:  [63-66] 66  Resp:  [18-20] 18  SpO2:  [95 %-100 %] 97 %  BP: (108-163)/(58-94) 111/58                                 Physical Exam         Neurosurgery Physical Exam  General: well developed, well nourished, no distress.   Head: normocephalic, atraumatic  Mental Status: Awake, Alert, Oriented  Speech: Clear with content appropriate to conversation  Cranial nerves: face symmetric, CN II-XII grossly intact.   Sensory: Loss of sensation along L anterior thigh, otherwise intact to light touch throughout     Motor Strength:      Strength   Deltoids Triceps Biceps Wrist Extension Wrist Flexion Hand    Upper: R 5/5 5/5 5/5 5/5 5/5 5/5     L 5/5 5/5 4+/5 4+/5 4+/5 5/5       Iliopsoas Quadriceps Knee  Flexion Tibialis  anterior Gastro- cnemius EHL   Lower: R 3/5 5/5 5/5 5/5 5/5 5/5     L 5/5 5/5 5/5 5/5 5/5 5/5      Ty: Positive LISA Crawford  Significant Labs:  Recent Labs   Lab 03/09/24  0439 03/10/24  0418    91    137  "  K 3.9 3.7    105   CO2 22* 25   BUN 12 11   CREATININE 0.8 0.7   CALCIUM 9.3 8.8     Recent Labs   Lab 03/09/24  0439 03/10/24  0418   WBC 15.88* 8.87   HGB 9.3* 8.8*   HCT 29.0* 27.8*    365     No results for input(s): "LABPT", "INR", "APTT" in the last 48 hours.  Microbiology Results (last 7 days)       ** No results found for the last 168 hours. **          All pertinent labs from the last 24 hours have been reviewed.    Significant Diagnostics:  I have reviewed all pertinent imaging results/findings within the past 24 hours.  "

## 2024-03-11 VITALS
RESPIRATION RATE: 18 BRPM | SYSTOLIC BLOOD PRESSURE: 163 MMHG | OXYGEN SATURATION: 99 % | HEART RATE: 71 BPM | BODY MASS INDEX: 42.36 KG/M2 | HEIGHT: 62 IN | TEMPERATURE: 98 F | WEIGHT: 230.19 LBS | DIASTOLIC BLOOD PRESSURE: 95 MMHG

## 2024-03-11 LAB
ANION GAP SERPL CALC-SCNC: 9 MMOL/L (ref 8–16)
BASOPHILS # BLD AUTO: 0.03 K/UL (ref 0–0.2)
BASOPHILS NFR BLD: 0.3 % (ref 0–1.9)
BUN SERPL-MCNC: 13 MG/DL (ref 6–20)
CALCIUM SERPL-MCNC: 9.5 MG/DL (ref 8.7–10.5)
CHLORIDE SERPL-SCNC: 103 MMOL/L (ref 95–110)
CO2 SERPL-SCNC: 24 MMOL/L (ref 23–29)
CREAT SERPL-MCNC: 0.7 MG/DL (ref 0.5–1.4)
DIFFERENTIAL METHOD BLD: ABNORMAL
EOSINOPHIL # BLD AUTO: 0.1 K/UL (ref 0–0.5)
EOSINOPHIL NFR BLD: 1.3 % (ref 0–8)
ERYTHROCYTE [DISTWIDTH] IN BLOOD BY AUTOMATED COUNT: 18.6 % (ref 11.5–14.5)
EST. GFR  (NO RACE VARIABLE): >60 ML/MIN/1.73 M^2
GLUCOSE SERPL-MCNC: 96 MG/DL (ref 70–110)
HCT VFR BLD AUTO: 30.1 % (ref 37–48.5)
HGB BLD-MCNC: 9.6 G/DL (ref 12–16)
IMM GRANULOCYTES # BLD AUTO: 0.01 K/UL (ref 0–0.04)
IMM GRANULOCYTES NFR BLD AUTO: 0.1 % (ref 0–0.5)
LYMPHOCYTES # BLD AUTO: 3.3 K/UL (ref 1–4.8)
LYMPHOCYTES NFR BLD: 37.2 % (ref 18–48)
MCH RBC QN AUTO: 26.7 PG (ref 27–31)
MCHC RBC AUTO-ENTMCNC: 31.9 G/DL (ref 32–36)
MCV RBC AUTO: 84 FL (ref 82–98)
MONOCYTES # BLD AUTO: 0.8 K/UL (ref 0.3–1)
MONOCYTES NFR BLD: 8.8 % (ref 4–15)
NEUTROPHILS # BLD AUTO: 4.6 K/UL (ref 1.8–7.7)
NEUTROPHILS NFR BLD: 52.3 % (ref 38–73)
NRBC BLD-RTO: 0 /100 WBC
PLATELET # BLD AUTO: 406 K/UL (ref 150–450)
PMV BLD AUTO: 9.9 FL (ref 9.2–12.9)
POTASSIUM SERPL-SCNC: 3.9 MMOL/L (ref 3.5–5.1)
RBC # BLD AUTO: 3.59 M/UL (ref 4–5.4)
SODIUM SERPL-SCNC: 136 MMOL/L (ref 136–145)
WBC # BLD AUTO: 8.78 K/UL (ref 3.9–12.7)

## 2024-03-11 PROCEDURE — 97535 SELF CARE MNGMENT TRAINING: CPT

## 2024-03-11 PROCEDURE — 97530 THERAPEUTIC ACTIVITIES: CPT

## 2024-03-11 PROCEDURE — 63600175 PHARM REV CODE 636 W HCPCS

## 2024-03-11 PROCEDURE — 36415 COLL VENOUS BLD VENIPUNCTURE: CPT | Performed by: PHYSICIAN ASSISTANT

## 2024-03-11 PROCEDURE — 99232 SBSQ HOSP IP/OBS MODERATE 35: CPT | Mod: ,,, | Performed by: STUDENT IN AN ORGANIZED HEALTH CARE EDUCATION/TRAINING PROGRAM

## 2024-03-11 PROCEDURE — 25000003 PHARM REV CODE 250

## 2024-03-11 PROCEDURE — 85025 COMPLETE CBC W/AUTO DIFF WBC: CPT | Performed by: PHYSICIAN ASSISTANT

## 2024-03-11 PROCEDURE — 80048 BASIC METABOLIC PNL TOTAL CA: CPT | Performed by: PHYSICIAN ASSISTANT

## 2024-03-11 PROCEDURE — 25000003 PHARM REV CODE 250: Performed by: PHYSICIAN ASSISTANT

## 2024-03-11 RX ORDER — CEPHALEXIN 500 MG/1
500 CAPSULE ORAL EVERY 6 HOURS
Qty: 20 CAPSULE | Refills: 0 | Status: SHIPPED | OUTPATIENT
Start: 2024-03-11 | End: 2024-03-16

## 2024-03-11 RX ORDER — AMOXICILLIN 250 MG
1 CAPSULE ORAL 2 TIMES DAILY
Qty: 60 TABLET | Refills: 0 | Status: SHIPPED | OUTPATIENT
Start: 2024-03-11 | End: 2024-04-10

## 2024-03-11 RX ORDER — POLYETHYLENE GLYCOL 3350 17 G/17G
17 POWDER, FOR SOLUTION ORAL 2 TIMES DAILY PRN
Qty: 1020 G | Refills: 0 | Status: SHIPPED | OUTPATIENT
Start: 2024-03-11 | End: 2024-04-10

## 2024-03-11 RX ORDER — HYDROCODONE BITARTRATE AND ACETAMINOPHEN 5; 325 MG/1; MG/1
1 TABLET ORAL EVERY 6 HOURS PRN
Qty: 20 TABLET | Refills: 0 | Status: SHIPPED | OUTPATIENT
Start: 2024-03-11 | End: 2024-03-16

## 2024-03-11 RX ORDER — PREGABALIN 75 MG/1
75 CAPSULE ORAL 2 TIMES DAILY
Qty: 60 CAPSULE | Refills: 0 | Status: SHIPPED | OUTPATIENT
Start: 2024-03-11 | End: 2024-04-10

## 2024-03-11 RX ORDER — METHOCARBAMOL 500 MG/1
500 TABLET, FILM COATED ORAL 3 TIMES DAILY PRN
Qty: 90 TABLET | Refills: 0 | Status: SHIPPED | OUTPATIENT
Start: 2024-03-11 | End: 2024-04-10

## 2024-03-11 RX ORDER — BISACODYL 10 MG/1
10 SUPPOSITORY RECTAL DAILY PRN
Status: DISCONTINUED | OUTPATIENT
Start: 2024-03-11 | End: 2024-03-11 | Stop reason: HOSPADM

## 2024-03-11 RX ADMIN — LISINOPRIL 20 MG: 20 TABLET ORAL at 08:03

## 2024-03-11 RX ADMIN — HYDROCHLOROTHIAZIDE 12.5 MG: 12.5 TABLET ORAL at 08:03

## 2024-03-11 RX ADMIN — HYDROCODONE BITARTRATE AND ACETAMINOPHEN 1 TABLET: 5; 325 TABLET ORAL at 10:03

## 2024-03-11 RX ADMIN — DOCUSATE SODIUM AND SENNOSIDES 1 TABLET: 8.6; 5 TABLET, FILM COATED ORAL at 08:03

## 2024-03-11 RX ADMIN — PREGABALIN 75 MG: 75 CAPSULE ORAL at 08:03

## 2024-03-11 RX ADMIN — DEXTROSE MONOHYDRATE 1 G: 2.5 INJECTION INTRAVENOUS at 04:03

## 2024-03-11 NOTE — PLAN OF CARE
[facility] - Neurosurgery (Hospital)  Discharge Final Note    Primary Care Provider: Ban Mckeon NP    Expected Discharge Date: 3/11/2024    Patient to be discharged home.  The patient will have home health with [facility] .  The patient was provided with a rolling walker.  Family to provide transportation home.  Neurosurgery clinic to schedule follow up appointment.    No future appointments.     Final Discharge Note (most recent)       Final Note - 03/11/24 1555          Final Note    Assessment Type Final Discharge Note     Anticipated Discharge Disposition Home-Health Care Svc        Post-Acute Status    Post-Acute Authorization Home Health     Home Health Status Set-up Complete/Auth obtained     Discharge Delays None known at this time                     Important Message from Medicare             Contact Info       [facility]    [facility] Phone: [phone]      * COVID-19: Services not specified [address], [address] [facility] [place] [address]       Next Steps: Follow up    Instructions: Current plan is to start servces on Wednesday but will call with date and time.

## 2024-03-11 NOTE — PLAN OF CARE
[facility] Conrad - Neurosurgery ([facility]    HOME HEALTH ORDERS  FACE TO FACE ENCOUNTER    Patient Name: Janae Fuller  YOB: 1985    PCP: Ban Mckeon NP   PCP Address: [address] [facility] [place] [address]  PCP Phone Number: [phone]  PCP Fax: [phone]    Encounter Date: 03/11/2024    Admit to Home Health    Diagnoses:  Active Hospital Problems    Diagnosis  POA    *Cervical myelopathy [G95.9]  Yes    Hyponatremia [E87.1]  Yes     Monitor with labs      Severe obesity (BMI >= 40) [E66.01]  Yes     Severe obesityu       Sensory loss [R20.0]  Yes     worsening paresthesia that began in her L shoulder has progressively worsened to involve both upper and lower extremities.       Essential hypertension [I10]  Yes     Chronic     lisinopril        Resolved Hospital Problems   No resolved problems to display.       No future appointments.        I have seen and examined this patient face to face today. My clinical findings that support the need for the home health skilled services and home bound status are the following:  Medical restrictions requiring assistance of another human to leave home due to Wound care needs, Post surgery monitoring, and Unstable ambulation.  Weakness/numbness causing balance and gait disturbance due to Weakness/Debility making it taxing to leave home.  Requiring assistive device to leave home due to unsteady gait caused by Weakness/Debility.    Allergies:  Review of patient's allergies indicates:   Allergen Reactions    Codeine Other (See Comments) and Hives     Reports shakes       Diet: regular diet    Activities: activity as tolerated    Nursing:   SN to complete comprehensive assessment including routine vital signs. Instruct on disease process and s/s of complications to report to MD. Review/verify medication list sent home with the patient at time of discharge  and instruct patient/caregiver as needed. Frequency may be adjusted depending on start of care date.  If patient has enteral feeding tube (NG, PEG, J-tube, G-tube), flush tube before and after feeding and/or medication administration with 20-30 mL of water.    Notify MD if SBP > 160 or < 90; DBP > 90 or < 50; HR > 120 or < 50; Temp > 101      CONSULTS:    Physical Therapy to evaluate and treat. Evaluate for home safety and equipment needs; Establish/upgrade home exercise program. Perform / instruct on therapeutic exercises, gait training, transfer training, and Range of Motion.  Occupational Therapy to evaluate and treat. Evaluate home environment for safety and equipment needs. Perform/Instruct on transfers, ADL training, ROM, and therapeutic exercises.    MISCELLANEOUS CARE:  N/A    WOUND CARE ORDERS  no      Medications: Review discharge medications with patient and family and provide education.      Current Discharge Medication List        START taking these medications    Details   bacitracin-neomycin-polymyxin b-hydrocortisone 1 % ointment Apply topically 2 (two) times daily.  Qty: 15 g, Refills: 0      cephALEXin (KEFLEX) 500 MG capsule Take 1 capsule (500 mg total) by mouth every 6 (six) hours. for 5 days  Qty: 20 capsule, Refills: 0      HYDROcodone-acetaminophen (NORCO) 5-325 mg per tablet Take 1 tablet by mouth every 6 (six) hours as needed for Pain.  Qty: 20 tablet, Refills: 0    Comments: Quantity prescribed more than 7 day supply? No      methocarbamoL (ROBAXIN) 500 MG Tab Take 1 tablet (500 mg total) by mouth 3 (three) times daily as needed.  Qty: 90 tablet, Refills: 0      polyethylene glycol (GLYCOLAX) 17 gram PwPk Take 17 g by mouth 2 (two) times daily as needed for Constipation.  Qty: 60 packet, Refills: 0      pregabalin (LYRICA) 75 MG capsule Take 1 capsule (75 mg total) by mouth 2 (two) times daily.  Qty: 60 capsule, Refills: 0      senna-docusate 8.6-50 mg (PERICOLACE) 8.6-50 mg per tablet Take 1 tablet by mouth 2 (two) times daily.  Qty: 60 tablet, Refills: 0           CONTINUE these  medications which have NOT CHANGED    Details   acetaminophen (TYLENOL) 650 MG TbSR Take 1 tablet (650 mg total) by mouth every 8 (eight) hours.  Qty: 20 tablet, Refills: 0      albuterol (PROVENTIL/VENTOLIN HFA) 90 mcg/actuation inhaler Inhale 2 puffs into the lungs every 4 (four) hours. Rescue  Qty: 18 g, Refills: 0      cetirizine (ZYRTEC) 10 MG tablet Take 1 tablet (10 mg total) by mouth once daily.  Qty: 30 tablet, Refills: 0      fluticasone propionate (FLONASE) 50 mcg/actuation nasal spray 1 spray (50 mcg total) by Each Nostril route 2 (two) times daily as needed for Rhinitis or Allergies.  Qty: 16 g, Refills: 0      lisinopril-hydrochlorothiazide (PRINZIDE,ZESTORETIC) 20-12.5 mg per tablet Take 1 tablet by mouth once daily.      pulse oximeter (PULSE OXIMETER) device by Apply Externally route 2 (two) times a day. Use twice daily at 8 AM and 3 PM and record the value in [employer] as directed.  Qty: 1 each, Refills: 0    Comments: This is a NO CHARGE item.  Please override price to zero.  DO NOT PRINT.  NORMAL MODE e-PRESCRIBE ONLY.           STOP taking these medications       ibuprofen (ADVIL,MOTRIN) 600 MG tablet Comments:   Reason for Stopping:               I certify that this patient is confined to her home and needs physical therapy and occupational therapy.

## 2024-03-11 NOTE — PT/OT/SLP PROGRESS
"Occupational Therapy   Treatment    Name: Janae Fuller  MRN: [id number]  Admitting Diagnosis:  Cervical myelopathy  3 Days Post-Op    Recommendations:     Discharge Recommendations: Low Intensity Therapy  Discharge Equipment Recommendations:  none, walker, rolling  Barriers to discharge:  None    Assessment:     Janae Fuller is a 38 y.o. female with a medical diagnosis of Cervical myelopathy.  She presents with performance deficits affecting function are weakness, impaired endurance, impaired sensation, impaired self care skills, impaired functional mobility, gait instability, impaired balance, decreased coordination, decreased upper extremity function, decreased lower extremity function, impaired fine motor, impaired coordination, orthopedic precautions.     Pt engaged well in therapy, preparing for discharge this afternoon.  Pt completed dressing ADLs at EOB and grooming ADLs while standing at sink in bathroom with set up assistance. Pt able to complete STS with CGA/RW, functional mobility in hallway with CGA/RW, though required standing rest break. Pt would benefit from continued at low intensity to maximize healing potential.      Rehab Prognosis:  Good; patient would benefit from acute skilled OT services to address these deficits and reach maximum level of function.       Plan:     Patient to be seen 3 x/week to address the above listed problems via self-care/home management, therapeutic activities, therapeutic exercises, neuromuscular re-education  Plan of Care Expires: 04/09/24  Plan of Care Reviewed with: patient    Subjective     Chief Complaint: "I'd like to get dressed to get home"   Patient/Family Comments/goals: Get better, return home   Pain/Comfort:  Pain Rating 1: 3/10  Location - Side 1: Bilateral  Location 1: neck  Pain Addressed 1: Distraction, Reposition  Pain Rating Post-Intervention 1:  (not quantified)    Objective:     Communicated with: RN prior to session.  Patient found HOB elevated with  " (no active lines) upon OT entry to room.    General Precautions: Standard, fall    Orthopedic Precautions:spinal precautions  Braces: Cervical collar  Respiratory Status: Room air     Occupational Performance:     Bed Mobility:    Patient completed Rolling/Turning to Left with  stand by assistance  Patient completed Rolling/Turning to Right with stand by assistance  Patient completed Scooting/Bridging with stand by assistance  Patient completed Supine to Sit with stand by assistance     Functional Mobility/Transfers:  Patient completed Sit <> Stand Transfer with stand by assistance  with  rolling walker   Pt completed Stand > Sit transfer to upright chair with SBA/RW.   Pt completed sustained stand at bathroom sink with SBA/RW  Functional Mobility: Pt engaging in functional mobility to simulate household/community distance from bathroom sink with CGA and utilizing RW in order to maximize functional activity tolerance and standing balance required for engagement in occupations of choice.  Pt had limited R foot clearance and difficulty with sensation 2* numbness, required 1 standing rest break and verbal encouragement    Activities of Daily Living:  Grooming: set up assistance brushing teeth while standing at sink, washing face  Upper Body Dressing: min A cookie tshirt with OT pulling over back, set up assist  donning C collar; min A donning hospital gown as robe.   Lower Body Dressing: contact guard assistance donning bilateral nonslip socks with vc's to follow spinal pc's and use figure 4 method, CGA donning underwear.       [facility] 6 Click ADL: 21    Treatment & Education:  Pt educated on role of OT, POC, and goals for therapy.    POC was dicussed with patient/caregiver, who was included in its development and is in agreement with the identified goals and treatment plan.   Patient and family aware of patient's deficits and therapy progression.   Time provided for therapeutic counseling and discussion of health  disposition.   Educated on importance of EOB/OOB mobility, maintaining routine, sitting up in chair, and maximizing independence with ADLs during admission   Pt completed ADLs and functional mobility for treatment session as noted above   Pt/caregiver verbalized understanding and expressed no further concerns/questions.  Updated communication board with level of assist required      Patient left up in chair with call button in reach and RN notified    GOALS:   Multidisciplinary Problems       Occupational Therapy Goals          Problem: Occupational Therapy    Goal Priority Disciplines Outcome Interventions   Occupational Therapy Goal     OT, PT/OT Ongoing, Progressing    Description: Goals to be met by: 3/23/24     Patient will increase functional independence with ADLs by performing:    LE Dressing with Supervision.  Grooming while standing at sink with Supervision.  Toileting from toilet with Supervision for hygiene and clothing management.   Toilet transfer to toilet with Supervision.                         Time Tracking:     OT Date of Treatment: 03/11/24  OT Start Time: 0847  OT Stop Time: 0913  OT Total Time (min): 26 min    Billable Minutes:Self Care/Home Management 8  Therapeutic Activity 18    OT/OLGA: OT          3/11/2024

## 2024-03-11 NOTE — NURSING
Patient educated on discharge instructions regarding meds, upcoming appointments, and signs symptoms to watch for. RN also educated pt on proper post op care for her surgical wound. Patient stated that she understood all discharge instructions. Patient picked up by friend/family member and transported to vehicle via wheelchair. Patient sent with all belongings and RW. VSS. Patient in good spirits and did not appear to be in any distress.   No Repair - Repaired With Adjacent Surgical Defect Text (Leave Blank If You Do Not Want): After obtaining clear surgical margins the defect was repaired concurrently with another surgical defect which was in close approximation.

## 2024-03-11 NOTE — DISCHARGE SUMMARY
[facility] ([facility])  Neurosurgery  Discharge Summary      Patient Name: Janae Fuller  MRN: [id number]  Admission Date: 3/6/2024  Hospital Length of Stay: 4 days  Discharge Date and Time:  03/11/2024 11:30 AM  Attending Physician: Shila Esquivel MD   Discharging Provider: Yolanda Calles PA-C  Primary Care Provider: Ban Mckeon NP    HPI:   Janae Fuller is a 37yo woman w/PMHx HTN referred to the ED by her PCP with concern for cervical myelopathy. She reports 2 months of worsening paresthesia that began in her L shoulder has progressively worsened to involve both upper and lower extremities. She has also developed weakness in her L hand which prevents her from holding objects or tying her shoes. Over the last week she has had difficulty ambulating and her family has noted she sometimes appears to lean to one side. She denies saddle anesthesia, bladder or bowel dysfunction.     Procedure(s) (LRB):  DISCECTOMY, SPINE, CERVICAL, ANTERIOR APPROACH, WITH FUSION SPINEWAVE (N/A)     Hospital Course:   Patient was admitted under the Neurosurgery service. MRI revealed significant cervical canal stenosis at C6-C7 and C5-C6 for which surgical decompression was offered and patient opted to proceed. She was brought to the Operating Room as scheduled on 3/8/2024. She tolerated surgery well without any difficulties and was transferred to the floor for post operative monitoring. PT/OT was consulted and the patient was cleared for return home upon discharge. At time of discharge on 3/11/24 the patient was neurologically stable, was afebrile, and vital signs were stable. She was tolerating a diet and voiding without difficulty. Discharge instructions were verbally discussed with the patient, including wound care and follow up appointments. The patient was also given a discharge instruction sheet explaining the aforementioned discussion. The patient verbalized understanding of instructions and agreed to the plan.     The  mobility limitation cannot be sufficiently resolved by the use of a cane. Patient's functional mobility deficit can be sufficiently resolved with the use of a (Rolling Walker or Walker). Patient's mobility limitation significantly impairs their ability to participate in one of more activities of daily living. The use of a (RW or Walker) will significantly improve the patient's ability to participate in MRADLS and the patient will use it on regular basis in the home.     Goals of Care Treatment Preferences:  Code Status: Full Code      Consults:   Consults (From admission, onward)          Status Ordering Provider     Inpatient consult to Neurosurgery  Once        Provider:  (Not yet assigned)    Completed GE MUNOZ            Physical Exam:  General: well developed, well nourished, no distress.   Head: normocephalic, atraumatic  Mental Status: Awake, Alert, Oriented  Speech: Clear with content appropriate to conversation  Cranial nerves: face symmetric, CN II-XII grossly intact.   Sensory: Loss of sensation along L anterior thigh, otherwise intact to light touch throughout     Motor Strength:   Strength   Deltoids Triceps Biceps Wrist Extension Wrist Flexion Hand    Upper: R 5/5 5/5 5/5 5/5 5/5 5/5     L 5/5 5/5 4+/5 4+/5 4+/5 5/5       Iliopsoas Quadriceps Knee  Flexion Tibialis  anterior Gastro- cnemius EHL   Lower: R 3/5 5/5 5/5 5/5 5/5 5/5     L 5/5 5/5 5/5 5/5 5/5 5/5      Crafword: Positive R Crawford    Incision clean, dry, intact    Significant Diagnostic Studies: N/A    Pending Diagnostic Studies:       None          Final Active Diagnoses:    Diagnosis Date Noted POA    PRINCIPAL PROBLEM:  Cervical myelopathy [G95.9] 03/06/2024 Yes    Hyponatremia [E87.1] 03/08/2024 Yes    Severe obesity (BMI >= 40) [E66.01] 03/08/2024 Yes    Sensory loss [R20.0] 03/08/2024 Yes    Essential hypertension [I10]  Yes     Chronic      Problems Resolved During this Admission:      Discharged Condition: good    "  Disposition: Home or Self Care    Follow Up:    Patient Instructions:      WALKER FOR HOME USE     Order Specific Question Answer Comments   Type of Walker: Adult (5'4"-6'6")    With wheels? Yes    Height: 5' 2" (1.575 m)    Weight: 104.4 kg (230 lb 2.6 oz)    Length of need (1-99 months): 99    Does patient have medical equipment at home? cane, straight    Please check all that apply: Patient is unable to safely ambulate without equipment.      X-Ray Cervical Spine 5 View With Flex And Ext   Standing Status: Future Standing Exp. Date: 03/11/25     Order Specific Question Answer Comments   May the Radiologist modify the order per protocol to meet the clinical needs of the patient? Yes    Release to patient Immediate      Notify your health care provider if you experience any of the following:  temperature >100.4     Notify your health care provider if you experience any of the following:  persistent nausea and vomiting or diarrhea     Notify your health care provider if you experience any of the following:  severe uncontrolled pain     Notify your health care provider if you experience any of the following:  redness, tenderness, or signs of infection (pain, swelling, redness, odor or green/yellow discharge around incision site)     Notify your health care provider if you experience any of the following:  difficulty breathing or increased cough     Notify your health care provider if you experience any of the following:  severe persistent headache     Notify your health care provider if you experience any of the following:  worsening rash     Notify your health care provider if you experience any of the following:  persistent dizziness, light-headedness, or visual disturbances     Notify your health care provider if you experience any of the following:  increased confusion or weakness     Notify your health care provider if you experience any of the following:     Activity as tolerated     Medications:  Reconciled " Home Medications:      Medication List        START taking these medications      bacitracin-neomycin-polymyxin b-hydrocortisone 1 % ointment  Apply topically 2 (two) times daily.     cephALEXin 500 MG capsule  Commonly known as: KEFLEX  Take 1 capsule (500 mg total) by mouth every 6 (six) hours. for 5 days     GAVILAX 17 gram/dose powder  Generic drug: polyethylene glycol  Use cap to measure 17 grams, mix in liquid and take by mouth 2 (two) times daily as needed for Constipation.     HYDROcodone-acetaminophen 5-325 mg per tablet  Commonly known as: NORCO  Take 1 tablet by mouth every 6 (six) hours as needed for Pain.     methocarbamoL 500 MG Tab  Commonly known as: ROBAXIN  Take 1 tablet (500 mg total) by mouth 3 (three) times daily as needed.     pregabalin 75 MG capsule  Commonly known as: LYRICA  Take 1 capsule (75 mg total) by mouth 2 (two) times daily.     STOOL SOFTENER-LAXATIVE 8.6-50 mg per tablet  Generic drug: senna-docusate 8.6-50 mg  Take 1 tablet by mouth 2 (two) times daily.            CONTINUE taking these medications      acetaminophen 650 MG Tbsr  Commonly known as: TYLENOL  Take 1 tablet (650 mg total) by mouth every 8 (eight) hours.     albuterol 90 mcg/actuation inhaler  Commonly known as: PROVENTIL/VENTOLIN HFA  Inhale 2 puffs into the lungs every 4 (four) hours. Rescue     cetirizine 10 MG tablet  Commonly known as: ZYRTEC  Take 1 tablet (10 mg total) by mouth once daily.     fluticasone propionate 50 mcg/actuation nasal spray  Commonly known as: FLONASE  1 spray (50 mcg total) by Each Nostril route 2 (two) times daily as needed for Rhinitis or Allergies.     lisinopriL-hydrochlorothiazide 20-12.5 mg per tablet  Commonly known as: PRINZIDE,ZESTORETIC  Take 1 tablet by mouth once daily.     pulse oximeter device  Commonly known as: pulse oximeter  by Apply Externally route 2 (two) times a day. Use twice daily at 8 AM and 3 PM and record the value in [employer] as directed.            STOP taking  these medications      ibuprofen 600 MG tablet  Commonly known as: YAMILA OBREGON PA-C  Neurosurgery  [facility])

## 2024-03-11 NOTE — PLAN OF CARE
Pt engaged well in therapy this date.     Problem: Occupational Therapy  Goal: Occupational Therapy Goal  Description: Goals to be met by: 3/23/24     Patient will increase functional independence with ADLs by performing:    LE Dressing with Supervision.  Grooming while standing at sink with Supervision.  Toileting from toilet with Supervision for hygiene and clothing management.   Toilet transfer to toilet with Supervision.    Outcome: Ongoing, Progressing

## 2024-03-11 NOTE — PLAN OF CARE
Problem: Adult Inpatient Plan of Care  Goal: Plan of Care Review  3/11/2024 0226 by Lily Joe RN  Outcome: Ongoing, Progressing  3/11/2024 0225 by Lily Joe RN  Outcome: Ongoing, Progressing  Goal: Patient-Specific Goal (Individualized)  3/11/2024 0226 by Lily Joe RN  Outcome: Ongoing, Progressing  3/11/2024 0225 by Lily Joe RN  Outcome: Ongoing, Progressing  Goal: Absence of Hospital-Acquired Illness or Injury  3/11/2024 0226 by Lily Joe RN  Outcome: Ongoing, Progressing  3/11/2024 0225 by Lily Joe RN  Outcome: Ongoing, Progressing  Goal: Optimal Comfort and Wellbeing  3/11/2024 0226 by Lily Joe RN  Outcome: Ongoing, Progressing  3/11/2024 0225 by Lily Joe RN  Outcome: Ongoing, Progressing  Goal: Readiness for Transition of Care  3/11/2024 0226 by Lily Joe RN  Outcome: Ongoing, Progressing  3/11/2024 0225 by Lily Joe RN  Outcome: Ongoing, Progressing     Problem: Bariatric Environmental Safety  Goal: Safety Maintained with Care  3/11/2024 0226 by Lily Joe RN  Outcome: Ongoing, Progressing  3/11/2024 0225 by Lily Joe RN  Outcome: Ongoing, Progressing     Problem: Infection  Goal: Absence of Infection Signs and Symptoms  3/11/2024 0226 by Lily Joe RN  Outcome: Ongoing, Progressing  3/11/2024 0225 by Lily Joe RN  Outcome: Ongoing, Progressing     Problem: Pain Acute  Goal: Acceptable Pain Control and Functional Ability  3/11/2024 0226 by Lily Joe RN  Outcome: Ongoing, Progressing  3/11/2024 0225 by Lily Joe RN  Outcome: Ongoing, Progressing     Problem: Fall Injury Risk  Goal: Absence of Fall and Fall-Related Injury  3/11/2024 0226 by Lily Joe RN  Outcome: Ongoing, Progressing  3/11/2024 0225 by Lily Joe RN  Outcome: Ongoing, Progressing

## 2024-03-27 ENCOUNTER — CLINICAL SUPPORT (OUTPATIENT)
Dept: NEUROSURGERY | Facility: CLINIC | Age: 39
End: 2024-03-27
Payer: MEDICAID

## 2024-03-27 DIAGNOSIS — Z98.1 S/P CERVICAL SPINAL FUSION: Primary | ICD-10-CM

## 2024-03-27 NOTE — PROGRESS NOTES
Janae Fuller is a 38 y.o. female here for 2 week post op wound check.     Surgery: Pt is POD 19   from Discectomy, Spine, Cervical, Anterior Approach, With Fusion Spinewave  on 3/8/2024  by Dr. Esquivel.    DME: none    Brace in Use: Rigid C-Collar     Compliant with Prescribed Bracing: Yes    RISK FACTORS FOR WOUND HEALING:  None    SUBJECTIVE     Pre-Op improved    New Symptoms: no other symptoms    Incision Symptoms: none    PAIN MANAGEMENT    Pain Rating Today: 3,     Current Pain Management Regime:    Tylenol only PRN     INCISION(S)    Location: cervical    Incision Status:  Clean, Dry, OLGA. Edges well-approximated. No drainage or swelling noted.     Picture:           INTERVENTION  Incision: Dissolvable Sutures in Place    Medication Refills Requested: None     New DME Requested: none.     EDUCATION  Reviewed Post Op instructions with patient/caregiver.   Discontinue Bacitratin  Keep incision OLGA, no lotions, creams or bandages.   Ok to shower without direct water pressure to incision. Pat incision dry after shower.   No lifting >10 lbs.  No twisting or bending surgical area greater than 45 degrees from neutral position.  No driving or operating machinery while taking narcotic pain medication or muscle relaxers and cleared by surgeon.   Wear brace at all times except when lying flat in bed.      Written copy of Post-op instructions provided to patient/caregiver. All questions answered. Patient/caregiver encouraged to call clinic with any future concerns. Patient/caregiver verbalizes understanding.     FOLLOW UP  Future Appointments   Date Time Provider Department Center   4/17/2024  9:00 AM [facility] XR3 [facility]   4/18/2024  3:00 PM Shila Esquivel MD [facility] [id number] [facility]

## 2024-04-16 ENCOUNTER — DOCUMENT SCAN (OUTPATIENT)
Dept: HOME HEALTH SERVICES | Facility: HOSPITAL | Age: 39
End: 2024-04-16
Payer: MEDICAID

## 2024-04-17 ENCOUNTER — HOSPITAL ENCOUNTER (OUTPATIENT)
Dept: RADIOLOGY | Facility: HOSPITAL | Age: 39
Discharge: HOME | End: 2024-04-17
Attending: STUDENT IN AN ORGANIZED HEALTH CARE EDUCATION/TRAINING PROGRAM
Payer: MEDICAID

## 2024-04-17 DIAGNOSIS — Z98.1 S/P CERVICAL SPINAL FUSION: ICD-10-CM

## 2024-04-17 PROCEDURE — 72052 X-RAY EXAM NECK SPINE 6/>VWS: CPT | Mod: 26,,, | Performed by: RADIOLOGY

## 2024-04-17 PROCEDURE — 72052 X-RAY EXAM NECK SPINE 6/>VWS: CPT | Mod: TC,FY

## 2024-04-18 ENCOUNTER — OFFICE VISIT (OUTPATIENT)
Dept: NEUROSURGERY | Facility: CLINIC | Age: 39
End: 2024-04-18
Payer: MEDICAID

## 2024-04-18 DIAGNOSIS — G95.9 CERVICAL MYELOPATHY: Primary | ICD-10-CM

## 2024-04-18 PROCEDURE — 4010F ACE/ARB THERAPY RXD/TAKEN: CPT | Mod: CPTII,95,, | Performed by: NEUROLOGICAL SURGERY

## 2024-04-18 PROCEDURE — 99024 POSTOP FOLLOW-UP VISIT: CPT | Mod: 95,,, | Performed by: NEUROLOGICAL SURGERY

## 2024-04-18 NOTE — PROGRESS NOTES
"Neurosurgery  Follow up    SUBJECTIVE:     History of Present Illness:  Janae Fuller is a 38 y.o. female who presented with progressive myelopathy since January, more acute for the past 4-5 days. I discussed with patient that her subacute decline is likely cervical myelopathy due to C6-C7>C5-C6 disc herniation. She underwent urgent C5-C6 ACDR and C6-C7 ACDF on 3/8/24.    As of 4/18/24, the patient returns in scheduled postoperative follow up. She reports that she has been well since surgery and compliant. She has some pain sometimes when she "does too much" to care for her children.     She has resumed driving and is looking forward to returning to work.     Her strength is significantly improved. She has resumed all of her activities of daily living.     She reports that she is still having severe hip pain and needs the walker.     Dr. Trip Quinteros is her PCP.     Review of patient's allergies indicates:   Allergen Reactions    Codeine Other (See Comments) and Hives     Reports shakes       Current Outpatient Medications   Medication Sig Dispense Refill    acetaminophen (TYLENOL) 650 MG TbSR Take 1 tablet (650 mg total) by mouth every 8 (eight) hours. 20 tablet 0    albuterol (PROVENTIL/VENTOLIN HFA) 90 mcg/actuation inhaler Inhale 2 puffs into the lungs every 4 (four) hours. Rescue 18 g 0    bacitracin-neomycin-polymyxin b-hydrocortisone 1 % ointment Apply topically 2 (two) times daily. 15 g 0    cetirizine (ZYRTEC) 10 MG tablet Take 1 tablet (10 mg total) by mouth once daily. 30 tablet 0    fluticasone propionate (FLONASE) 50 mcg/actuation nasal spray 1 spray (50 mcg total) by Each Nostril route 2 (two) times daily as needed for Rhinitis or Allergies. 16 g 0    lisinopril-hydrochlorothiazide (PRINZIDE,ZESTORETIC) 20-12.5 mg per tablet Take 1 tablet by mouth once daily.      pregabalin (LYRICA) 75 MG capsule Take 1 capsule (75 mg total) by mouth 2 (two) times daily. 60 capsule 0    pulse oximeter (PULSE " OXIMETER) device by Apply Externally route 2 (two) times a day. Use twice daily at 8 AM and 3 PM and record the value in MyChart as directed. 1 each 0     No current facility-administered medications for this visit.       Past Medical History:   Diagnosis Date    COVID-19 03/04/2021    Hypertension      Past Surgical History:   Procedure Laterality Date    ANTERIOR CERVICAL DISCECTOMY W/ FUSION N/A 3/8/2024    Procedure: DISCECTOMY, SPINE, CERVICAL, ANTERIOR APPROACH, WITH FUSION SPINEWAVE;  Surgeon: Shila Esquivel MD;  Location: [facility] OR [address] [place] [address];  Service: Neurosurgery;  Laterality: N/A;  C5-C6/C6-C7 Awake A-line MAP>85, caspar head ta, MEPs/SSEPs/EMG, fluoro, possible arthroplasty    JOINT REPLACEMENT Right 2003    Hip replacement     Family History    None       Social History     Socioeconomic History    Marital status: Single   Tobacco Use    Smoking status: Never    Smokeless tobacco: Never   Substance and Sexual Activity    Alcohol use: Not Currently    Drug use: Never    Sexual activity: Yes     Partners: Male     Social Determinants of Health     Financial Resource Strain: Low Risk  (3/9/2024)    Overall Financial Resource Strain (CARDIA)     Difficulty of Paying Living Expenses: Not very hard   Food Insecurity: No Food Insecurity (3/9/2024)    Hunger Vital Sign     Worried About Running Out of Food in the Last Year: Never true     Ran Out of Food in the Last Year: Never true   Transportation Needs: No Transportation Needs (3/9/2024)    PRAPARE - Transportation     Lack of Transportation (Medical): No     Lack of Transportation (Non-Medical): No   Physical Activity: Inactive (3/9/2024)    Exercise Vital Sign     Days of Exercise per Week: 0 days     Minutes of Exercise per Session: 0 min   Stress: No Stress Concern Present (3/9/2024)    [employer] [place] [employer] - Occupational Stress Questionnaire     Feeling of Stress : Only a little   Social Connections: Moderately Isolated (3/9/2024)     Social Connection and Isolation Panel [NHANES]     Frequency of Communication with Friends and Family: Three times a week     Frequency of Social Gatherings with Friends and Family: Three times a week     Attends Mandaeism Services: More than 4 times per year     Active Member of Clubs or Organizations: No     Attends Club or Organization Meetings: Never     Marital Status: Never    Housing Stability: Low Risk  (3/9/2024)    Housing Stability Vital Sign     Unable to Pay for Housing in the Last Year: No     Number of Places Lived in the Last Year: 1     Unstable Housing in the Last Year: No       Review of Systems    OBJECTIVE:     Vital Signs     There is no height or weight on file to calculate BMI.      Physical Exam:    Constitutional: She appears well-developed and well-nourished. No distress.     Eyes: EOM are normal.     Skin:   Incision appears well healed     Psych/Behavior: She is alert. She is oriented to person, place, and time.     Musculoskeletal:      Comments: No weakness per video visit      Neurological:        Coordination: She has normal finger to nose coordination.     Pulmonary: nonlabored respirations     Hematologic: no bruising noted     Diagnostic Results:  Xray demonstrates stable placement of hardware     ASSESSMENT/PLAN:   Janae Fuller is a 38 y.o. female who presented with progressive myelopathy since January, more acute for the past 4-5 days. I discussed with patient that her subacute decline is likely cervical myelopathy due to C6-C7>C5-C6 disc herniation. She is s/p C5-C6 ACDR and C6-C7 ACDF on 3/8/24.     She has shown remarkable improvement in strength since surgery and has been able to resume almost all of her activities. I have suggested that she may stop wearing the brace at this point. I would like to see her back in about 6 weeks with repeat Xrays to discuss her return to work.     I have also placed a consult for PT in interim, with which she is welcome to participate  if she would like.     I have encouraged her to contact the clinic in interim with any questions, concerns, or adverse clinical change     The patient location is: at home   The chief complaint leading to consultation is: postop    Visit type: audiovisual    Face to Face time with patient: 5  10 minutes of total time spent on the encounter, which includes face to face time and non-face to face time preparing to see the patient (eg, review of tests), Obtaining and/or reviewing separately obtained history, Documenting clinical information in the electronic or other health record, Independently interpreting results (not separately reported) and communicating results to the patient/family/caregiver, or Care coordination (not separately reported).         Each patient to whom he or she provides medical services by telemedicine is:  (1) informed of the relationship between the physician and patient and the respective role of any other health care provider with respect to management of the patient; and (2) notified that he or she may decline to receive medical services by telemedicine and may withdraw from such care at any time.    Notes: Note generated with voice recognition software; please excuse any typographical errors.

## 2024-05-07 ENCOUNTER — EXTERNAL HOME HEALTH (OUTPATIENT)
Dept: HOME HEALTH SERVICES | Facility: HOSPITAL | Age: 39
End: 2024-05-07
Payer: MEDICAID

## 2024-05-20 NOTE — ADDENDUM NOTE
Addended by: CARLY TAVERAS on: 5/20/2024 12:57 PM     Modules accepted: Orders, Level of Service

## 2024-05-29 ENCOUNTER — HOSPITAL ENCOUNTER (OUTPATIENT)
Dept: RADIOLOGY | Facility: HOSPITAL | Age: 39
Discharge: HOME | End: 2024-05-29
Attending: NEUROLOGICAL SURGERY
Payer: MEDICAID

## 2024-05-29 DIAGNOSIS — G95.9 CERVICAL MYELOPATHY: ICD-10-CM

## 2024-05-29 PROCEDURE — 72050 X-RAY EXAM NECK SPINE 4/5VWS: CPT | Mod: TC

## 2024-05-29 PROCEDURE — 72050 X-RAY EXAM NECK SPINE 4/5VWS: CPT | Mod: 26,,, | Performed by: RADIOLOGY

## 2024-05-31 NOTE — ADDENDUM NOTE
Addendum  created 05/31/24 0908 by Gabriel Silva DO    Clinical Note Signed, Intraprocedure Blocks edited, SmartForm saved

## 2024-06-06 ENCOUNTER — OFFICE VISIT (OUTPATIENT)
Dept: NEUROSURGERY | Facility: CLINIC | Age: 39
End: 2024-06-06
Payer: MEDICAID

## 2024-06-06 VITALS — RESPIRATION RATE: 16 BRPM

## 2024-06-06 DIAGNOSIS — Z98.1 ARTHRODESIS STATUS: Primary | ICD-10-CM

## 2024-06-06 DIAGNOSIS — G95.9 CERVICAL MYELOPATHY: ICD-10-CM

## 2024-06-06 PROCEDURE — 99024 POSTOP FOLLOW-UP VISIT: CPT | Mod: ,,, | Performed by: NEUROLOGICAL SURGERY

## 2024-06-06 PROCEDURE — 4010F ACE/ARB THERAPY RXD/TAKEN: CPT | Mod: CPTII,,, | Performed by: NEUROLOGICAL SURGERY

## 2024-06-06 NOTE — PROGRESS NOTES
"Neurosurgery  Follow up    SUBJECTIVE:     History of Present Illness:  Janae Fuller is a 38 y.o. female who presented with progressive myelopathy since January, more acute for the past 4-5 days. I discussed with patient that her subacute decline is likely cervical myelopathy due to C6-C7>C5-C6 disc herniation. She underwent urgent C5-C6 ACDR and C6-C7 ACDF on 3/8/24.    As of 4/18/24, the patient returns in scheduled postoperative follow up. She reports that she has been well since surgery and compliant. She has some pain sometimes when she "does too much" to care for her children.     She has resumed driving and is looking forward to returning to work.     Her strength is significantly improved. She has resumed all of her activities of daily living.     She reports that she is still having severe hip pain and needs the walker.     Dr. Trip Quinteros is her PCP.     As of 6/6/24, the patient returns in postoperative follow up as scheduled. She reports that she had a hysteroscopy with removal of fibroids, and she has been in much less pain since. She reports that she is now sleeping on her tummy in bed. She endorses continued improvement of her hand control and ambulation. She feels that she has complete control of the right hand and foot now. She also endorses that her neck range of motion is almost back to baseline.     Review of patient's allergies indicates:   Allergen Reactions    Codeine Other (See Comments) and Hives     Reports shakes       Current Outpatient Medications   Medication Sig Dispense Refill    acetaminophen (TYLENOL) 650 MG TbSR Take 1 tablet (650 mg total) by mouth every 8 (eight) hours. 20 tablet 0    albuterol (PROVENTIL/VENTOLIN HFA) 90 mcg/actuation inhaler Inhale 2 puffs into the lungs every 4 (four) hours. Rescue 18 g 0    bacitracin-neomycin-polymyxin b-hydrocortisone 1 % ointment Apply topically 2 (two) times daily. 15 g 0    cetirizine (ZYRTEC) 10 MG tablet Take 1 tablet (10 mg total) " by mouth once daily. 30 tablet 0    fluticasone propionate (FLONASE) 50 mcg/actuation nasal spray 1 spray (50 mcg total) by Each Nostril route 2 (two) times daily as needed for Rhinitis or Allergies. 16 g 0    lisinopril-hydrochlorothiazide (PRINZIDE,ZESTORETIC) 20-12.5 mg per tablet Take 1 tablet by mouth once daily.      pregabalin (LYRICA) 75 MG capsule Take 1 capsule (75 mg total) by mouth 2 (two) times daily. 60 capsule 0    pulse oximeter (PULSE OXIMETER) device by Apply Externally route 2 (two) times a day. Use twice daily at 8 AM and 3 PM and record the value in [employer] as directed. 1 each 0     No current facility-administered medications for this visit.       Past Medical History:   Diagnosis Date    COVID-19 03/04/2021    Hypertension      Past Surgical History:   Procedure Laterality Date    ANTERIOR CERVICAL DISCECTOMY W/ FUSION N/A 3/8/2024    Procedure: DISCECTOMY, SPINE, CERVICAL, ANTERIOR APPROACH, WITH FUSION SPINEWAVE;  Surgeon: Shila Esquivel MD;  Location: [facility] OR [address] [place] [address];  Service: Neurosurgery;  Laterality: N/A;  C5-C6/C6-C7 Awake A-line MAP>85, caspar head ta, MEPs/SSEPs/EMG, fluoro, possible arthroplasty    JOINT REPLACEMENT Right 2003    Hip replacement     Family History    None       Social History     Socioeconomic History    Marital status: Single   Tobacco Use    Smoking status: Never    Smokeless tobacco: Never   Substance and Sexual Activity    Alcohol use: Not Currently    Drug use: Never    Sexual activity: Yes     Partners: Male     Social Determinants of Health     Financial Resource Strain: Low Risk  (3/9/2024)    Overall Financial Resource Strain (CARDIA)     Difficulty of Paying Living Expenses: Not very hard   Food Insecurity: No Food Insecurity (3/9/2024)    Hunger Vital Sign     Worried About Running Out of Food in the Last Year: Never true     Ran Out of Food in the Last Year: Never true   Transportation Needs: No Transportation Needs (3/9/2024)    PRAPARE -  Transportation     Lack of Transportation (Medical): No     Lack of Transportation (Non-Medical): No   Physical Activity: Inactive (3/9/2024)    Exercise Vital Sign     Days of Exercise per Week: 0 days     Minutes of Exercise per Session: 0 min   Stress: No Stress Concern Present (3/9/2024)    [employer] [place] [employer] - Occupational Stress Questionnaire     Feeling of Stress : Only a little   Housing Stability: Low Risk  (3/9/2024)    Housing Stability Vital Sign     Unable to Pay for Housing in the Last Year: No     Number of Places Lived in the Last Year: 1     Unstable Housing in the Last Year: No       Review of Systems    OBJECTIVE:     Vital Signs  Resp: 16  There is no height or weight on file to calculate BMI.      Physical Exam:    Constitutional: She appears well-developed and well-nourished. No distress.     Eyes: EOM are normal.     Skin:   Incision well healed     Psych/Behavior: She is alert. She is oriented to person, place, and time.     Musculoskeletal:      Comments: Full strength except 4+/5 left tricep     Neurological:        Coordination: She has normal finger to nose coordination.     Pulmonary: nonlabored respirations     Hematologic: no bruising noted     Diagnostic Results:  Xray demonstrates stable placement of hardware     ASSESSMENT/PLAN:   Janae Fuller is a 38 y.o. female who presented with progressive myelopathy since January, more acute for the past 4-5 days. I discussed with patient that her subacute decline is likely cervical myelopathy due to C6-C7>C5-C6 disc herniation. She is s/p C5-C6 ACDR and C6-C7 ACDF on 3/8/24.     She has shown remarkable improvement in strength since surgery and has been able to resume almost all of her activities. I have suggested that she may stop wearing the brace at this point. I would like to see her back  about 6 months postop with CT cervical spine to assess for arthrodesis.      I have encouraged her to continue PT. Based on her  progress, there is no neurosurgical indication to her returning to work.      I have encouraged her to contact the clinic in interim with any questions, concerns, or adverse clinical change

## 2024-08-02 ENCOUNTER — PATIENT MESSAGE (OUTPATIENT)
Dept: ORTHOPEDICS | Facility: CLINIC | Age: 39
End: 2024-08-02
Payer: MEDICAID

## 2024-08-02 DIAGNOSIS — M25.551 RIGHT HIP PAIN: Primary | ICD-10-CM

## 2024-09-03 ENCOUNTER — TELEPHONE (OUTPATIENT)
Dept: NEUROSURGERY | Facility: CLINIC | Age: 39
End: 2024-09-03
Payer: MEDICAID

## 2024-09-04 ENCOUNTER — HOSPITAL ENCOUNTER (OUTPATIENT)
Dept: RADIOLOGY | Facility: HOSPITAL | Age: 39
Discharge: HOME | End: 2024-09-04
Attending: NEUROLOGICAL SURGERY
Payer: MEDICAID

## 2024-09-04 DIAGNOSIS — Z98.1 ARTHRODESIS STATUS: ICD-10-CM

## 2024-09-04 PROCEDURE — 72125 CT NECK SPINE W/O DYE: CPT | Mod: 26,,, | Performed by: RADIOLOGY

## 2024-09-04 PROCEDURE — 72125 CT NECK SPINE W/O DYE: CPT | Mod: TC

## 2024-09-05 ENCOUNTER — OFFICE VISIT (OUTPATIENT)
Dept: NEUROSURGERY | Facility: CLINIC | Age: 39
End: 2024-09-05
Payer: MEDICAID

## 2024-09-05 ENCOUNTER — TELEPHONE (OUTPATIENT)
Dept: NEUROSURGERY | Facility: CLINIC | Age: 39
End: 2024-09-05
Payer: MEDICAID

## 2024-09-05 DIAGNOSIS — G95.9 CERVICAL MYELOPATHY: Primary | ICD-10-CM

## 2024-09-05 PROCEDURE — 4010F ACE/ARB THERAPY RXD/TAKEN: CPT | Mod: CPTII,95,, | Performed by: NEUROLOGICAL SURGERY

## 2024-09-05 PROCEDURE — 99213 OFFICE O/P EST LOW 20 MIN: CPT | Mod: 95,,, | Performed by: NEUROLOGICAL SURGERY

## 2024-09-05 NOTE — PROGRESS NOTES
"Neurosurgery  Follow up    SUBJECTIVE:     History of Present Illness:  Janae Fuller is a 38 y.o. female who presented with progressive myelopathy since January, more acute for the past 4-5 days. I discussed with patient that her subacute decline is likely cervical myelopathy due to C6-C7>C5-C6 disc herniation. She underwent urgent C5-C6 ACDR and C6-C7 ACDF on 3/8/24.    As of 4/18/24, the patient returns in scheduled postoperative follow up. She reports that she has been well since surgery and compliant. She has some pain sometimes when she "does too much" to care for her children.     She has resumed driving and is looking forward to returning to work.     Her strength is significantly improved. She has resumed all of her activities of daily living.     She reports that she is still having severe hip pain and needs the walker.     Dr. Trip Quinteros is her PCP.     As of 6/6/24, the patient returns in postoperative follow up as scheduled. She reports that she had a hysteroscopy with removal of fibroids, and she has been in much less pain since. She reports that she is now sleeping on her tummy in bed. She endorses continued improvement of her hand control and ambulation. She feels that she has complete control of the right hand and foot now. She also endorses that her neck range of motion is almost back to baseline.     As of 9/5/24, the patient presents in scheduled postoperative follow-up with CT cervical spine. She has resumed working as of August 8. She had some mid-back pain that has now improved. She is still sleeping in the recliner.     Review of patient's allergies indicates:   Allergen Reactions    Codeine Other (See Comments) and Hives     Reports shakes       Current Outpatient Medications   Medication Sig Dispense Refill    acetaminophen (TYLENOL) 650 MG TbSR Take 1 tablet (650 mg total) by mouth every 8 (eight) hours. 20 tablet 0    albuterol (PROVENTIL/VENTOLIN HFA) 90 mcg/actuation inhaler Inhale 2 " puffs into the lungs every 4 (four) hours. Rescue 18 g 0    bacitracin-neomycin-polymyxin b-hydrocortisone 1 % ointment Apply topically 2 (two) times daily. 15 g 0    cetirizine (ZYRTEC) 10 MG tablet Take 1 tablet (10 mg total) by mouth once daily. 30 tablet 0    fluticasone propionate (FLONASE) 50 mcg/actuation nasal spray 1 spray (50 mcg total) by Each Nostril route 2 (two) times daily as needed for Rhinitis or Allergies. 16 g 0    lisinopril-hydrochlorothiazide (PRINZIDE,ZESTORETIC) 20-12.5 mg per tablet Take 1 tablet by mouth once daily.      pregabalin (LYRICA) 75 MG capsule Take 1 capsule (75 mg total) by mouth 2 (two) times daily. 60 capsule 0    pulse oximeter (PULSE OXIMETER) device by Apply Externally route 2 (two) times a day. Use twice daily at 8 AM and 3 PM and record the value in [employer] as directed. 1 each 0     No current facility-administered medications for this visit.       Past Medical History:   Diagnosis Date    COVID-19 03/04/2021    Hypertension      Past Surgical History:   Procedure Laterality Date    ANTERIOR CERVICAL DISCECTOMY W/ FUSION N/A 3/8/2024    Procedure: DISCECTOMY, SPINE, CERVICAL, ANTERIOR APPROACH, WITH FUSION SPINEWAVE;  Surgeon: Shila Esquivel MD;  Location: [facility] OR [address] [place] [address];  Service: Neurosurgery;  Laterality: N/A;  C5-C6/C6-C7 Awake A-line MAP>85, caspar head ta, MEPs/SSEPs/EMG, fluoro, possible arthroplasty    JOINT REPLACEMENT Right 2003    Hip replacement     Family History    None       Social History     Socioeconomic History    Marital status: Single   Tobacco Use    Smoking status: Never    Smokeless tobacco: Never   Substance and Sexual Activity    Alcohol use: Not Currently    Drug use: Never    Sexual activity: Yes     Partners: Male     Social Determinants of Health     Financial Resource Strain: Low Risk  (3/9/2024)    Overall Financial Resource Strain (CARDIA)     Difficulty of Paying Living Expenses: Not very hard   Food Insecurity: No Food Insecurity  (3/9/2024)    Hunger Vital Sign     Worried About Running Out of Food in the Last Year: Never true     Ran Out of Food in the Last Year: Never true   Transportation Needs: No Transportation Needs (3/9/2024)    PRAPARE - Transportation     Lack of Transportation (Medical): No     Lack of Transportation (Non-Medical): No   Physical Activity: Inactive (3/9/2024)    Exercise Vital Sign     Days of Exercise per Week: 0 days     Minutes of Exercise per Session: 0 min   Stress: No Stress Concern Present (3/9/2024)    [employer] - Occupational Stress Questionnaire     Feeling of Stress : Only a little   Housing Stability: Low Risk  (3/9/2024)    Housing Stability Vital Sign     Unable to Pay for Housing in the Last Year: No     Number of Places Lived in the Last Year: 1     Unstable Housing in the Last Year: No       Review of Systems    OBJECTIVE:     Vital Signs     There is no height or weight on file to calculate BMI.      Physical Exam:    Constitutional: She appears well-developed and well-nourished. No distress.     Eyes: EOM are normal.     Skin:   Incision well healed     Psych/Behavior: She is alert. She is oriented to person, place, and time.     Musculoskeletal:      Comments: Full strength except 4+/5 left tricep     Neurological:        Coordination: She has normal finger to nose coordination.     Pulmonary: nonlabored respirations     Hematologic: no bruising noted     Diagnostic Results:  CT demonstrates stable placement of hardware with good arthrodesis at level of fusion     ASSESSMENT/PLAN:   Janae Fuller is a 38 y.o. female who presented with progressive myelopathy since January, more acute for the past 4-5 days. I discussed with patient that her subacute decline is likely cervical myelopathy due to C6-C7>C5-C6 disc herniation. She is s/p C5-C6 ACDR and C6-C7 ACDF on 3/8/24.     She has shown remarkable improvement in strength since surgery and has been able to resume almost  all of her activities.    I will not schedule her for formal follow-up, but I am happy to see her back in the future if I can be of assistance.    We talked about preventative health measures she can take to help minimize the risk of further surgery.      I have encouraged her to contact the clinic in interim with any questions, concerns, or adverse clinical change     The patient location is: in [place]   The chief complaint leading to consultation is: postop ACDF     Visit type: audiovisual    Face to Face time with patient: 7  23 minutes of total time spent on the encounter, which includes face to face time and non-face to face time preparing to see the patient (eg, review of tests), Obtaining and/or reviewing separately obtained history, Documenting clinical information in the electronic or other health record, Independently interpreting results (not separately reported) and communicating results to the patient/family/caregiver, or Care coordination (not separately reported).         Each patient to whom he or she provides medical services by telemedicine is:  (1) informed of the relationship between the physician and patient and the respective role of any other health care provider with respect to management of the patient; and (2) notified that he or she may decline to receive medical services by telemedicine and may withdraw from such care at any time.    Notes: Note generated with voice recognition software; please excuse any typographical errors.

## 2024-09-10 ENCOUNTER — HOSPITAL ENCOUNTER (EMERGENCY)
Facility: HOSPITAL | Age: 39
Discharge: HOME | End: 2024-09-10
Attending: EMERGENCY MEDICINE
Payer: MEDICAID

## 2024-09-10 VITALS
SYSTOLIC BLOOD PRESSURE: 158 MMHG | BODY MASS INDEX: 42.07 KG/M2 | TEMPERATURE: 99 F | WEIGHT: 230 LBS | RESPIRATION RATE: 18 BRPM | DIASTOLIC BLOOD PRESSURE: 98 MMHG | HEART RATE: 66 BPM | OXYGEN SATURATION: 99 %

## 2024-09-10 DIAGNOSIS — S80.12XA CONTUSION OF LEFT LOWER LEG, INITIAL ENCOUNTER: ICD-10-CM

## 2024-09-10 DIAGNOSIS — M79.605 LEFT LEG PAIN: ICD-10-CM

## 2024-09-10 DIAGNOSIS — N39.0 ACUTE UTI: Primary | ICD-10-CM

## 2024-09-10 LAB
B-HCG UR QL: NEGATIVE
BILIRUBIN, POC UA: NEGATIVE
BLOOD, POC UA: ABNORMAL
CLARITY, UA: CLEAR
COLOR, UA: ABNORMAL
CTP QC/QA: YES
GLUCOSE, POC UA: NEGATIVE
KETONES, POC UA: NEGATIVE
LEUKOCYTE EST, POC UA: ABNORMAL
NITRITE, POC UA: NEGATIVE
PH UR STRIP: 6 [PH] (ref 5–8)
PROTEIN, POC UA: NEGATIVE
SPECIFIC GRAVITY, POC UA: 1.01 (ref 1–1.03)
UROBILINOGEN, POC UA: 0.2 E.U./DL

## 2024-09-10 PROCEDURE — 96372 THER/PROPH/DIAG INJ SC/IM: CPT | Performed by: EMERGENCY MEDICINE

## 2024-09-10 PROCEDURE — 81003 URINALYSIS AUTO W/O SCOPE: CPT | Mod: ER

## 2024-09-10 PROCEDURE — 81025 URINE PREGNANCY TEST: CPT | Mod: ER | Performed by: EMERGENCY MEDICINE

## 2024-09-10 PROCEDURE — 99284 EMERGENCY DEPT VISIT MOD MDM: CPT | Mod: 25,ER

## 2024-09-10 PROCEDURE — 63600175 PHARM REV CODE 636 W HCPCS: Mod: ER | Performed by: EMERGENCY MEDICINE

## 2024-09-10 RX ORDER — DICLOFENAC SODIUM 10 MG/G
2 GEL TOPICAL 4 TIMES DAILY PRN
Qty: 150 G | Refills: 0 | Status: SHIPPED | OUTPATIENT
Start: 2024-09-10 | End: 2024-09-20

## 2024-09-10 RX ORDER — NITROFURANTOIN 25; 75 MG/1; MG/1
100 CAPSULE ORAL 2 TIMES DAILY
Qty: 10 CAPSULE | Refills: 0 | Status: SHIPPED | OUTPATIENT
Start: 2024-09-10 | End: 2024-09-15

## 2024-09-10 RX ORDER — IBUPROFEN 600 MG/1
600 TABLET ORAL EVERY 6 HOURS PRN
Qty: 20 TABLET | Refills: 0 | Status: SHIPPED | OUTPATIENT
Start: 2024-09-10

## 2024-09-10 RX ORDER — CYCLOBENZAPRINE HCL 10 MG
10 TABLET ORAL 3 TIMES DAILY PRN
Qty: 15 TABLET | Refills: 0 | Status: SHIPPED | OUTPATIENT
Start: 2024-09-10 | End: 2024-09-15

## 2024-09-10 RX ORDER — KETOROLAC TROMETHAMINE 30 MG/ML
30 INJECTION, SOLUTION INTRAMUSCULAR; INTRAVENOUS
Status: COMPLETED | OUTPATIENT
Start: 2024-09-10 | End: 2024-09-10

## 2024-09-10 RX ORDER — ACETAMINOPHEN 500 MG
1000 TABLET ORAL EVERY 6 HOURS PRN
Qty: 30 TABLET | Refills: 0 | Status: SHIPPED | OUTPATIENT
Start: 2024-09-10

## 2024-09-10 RX ADMIN — KETOROLAC TROMETHAMINE 30 MG: 30 INJECTION, SOLUTION INTRAMUSCULAR at 11:09

## 2024-09-10 NOTE — ED PROVIDER NOTES
Encounter Date: 9/10/2024    SCRIBE #1 NOTE: I, Annie Goodson, am scribing for, and in the presence of,  Ceci Jaramillo DO.       History     Chief Complaint   Patient presents with    Fall     Pt fell this am hurting left leg        39 yo F with PMHx of HTN presents to the ED with a chief complaint of left lower leg pain s/p mechanical fall this AM. Reports she slipped and fell in the grass while walking to her car. Denies head trauma. Denies preceding dizziness, light-headedness, syncope. Reports LMP was last week. No other exacerbating or alleviating factors. Denies hip pain, knee pain, foot pain, back pain, abdominal pain, LOC, headache, nausea, vomiting, CP, SOB, or other associated symptoms. Denies any drug allergies, but states she does not like how codeine makes her feel.     The history is provided by the patient. No  was used.     Review of patient's allergies indicates:   Allergen Reactions    Codeine Other (See Comments) and Hives     Reports shakes     Past Medical History:   Diagnosis Date    COVID-19 03/04/2021    Hypertension      Past Surgical History:   Procedure Laterality Date    ANTERIOR CERVICAL DISCECTOMY W/ FUSION N/A 3/8/2024    Procedure: DISCECTOMY, SPINE, CERVICAL, ANTERIOR APPROACH, WITH FUSION SPINEWAVE;  Surgeon: Shila Esquivel MD;  Location: [facility] OR [address] [place] [address];  Service: Neurosurgery;  Laterality: N/A;  C5-C6/C6-C7 Awake A-line MAP>85, caspar head ta, MEPs/SSEPs/EMG, fluoro, possible arthroplasty    JOINT REPLACEMENT Right 2003    Hip replacement     No family history on file.  Social History     Tobacco Use    Smoking status: Never    Smokeless tobacco: Never   Substance Use Topics    Alcohol use: Not Currently    Drug use: Never     Review of Systems   Respiratory:  Negative for shortness of breath.    Cardiovascular:  Negative for chest pain.   Gastrointestinal:  Negative for abdominal pain, diarrhea, nausea and vomiting.   Musculoskeletal:  Positive for  myalgias. Negative for arthralgias, back pain and neck pain.   Neurological:  Negative for dizziness, syncope, weakness, light-headedness, numbness and headaches.   All other systems reviewed and are negative.      Physical Exam     Initial Vitals [09/10/24 1007]   BP Pulse Resp Temp SpO2   (!) 167/103 61 20 98.9 °F (37.2 °C) 98 %      MAP       --         Physical Exam    Nursing note and vitals reviewed.  Constitutional: She appears well-developed and well-nourished.   HENT:   Head: Normocephalic and atraumatic.   Right Ear: External ear normal.   Left Ear: External ear normal.   Nose: Nose normal.   Mouth/Throat: Oropharynx is clear and moist.   Eyes: Conjunctivae are normal.   Neck: Phonation normal. Neck supple.   Normal range of motion.  Cardiovascular:  Normal rate, regular rhythm, normal heart sounds and intact distal pulses.     Exam reveals no gallop and no friction rub.       No murmur heard.  Pulmonary/Chest: Effort normal and breath sounds normal. No stridor. No respiratory distress. She has no wheezes. She has no rhonchi. She has no rales. She exhibits no tenderness.   Abdominal: Abdomen is soft. Bowel sounds are normal. She exhibits no distension. There is no abdominal tenderness. There is no rigidity, no rebound and no guarding.   Musculoskeletal:         General: No edema. Normal range of motion.      Cervical back: Normal range of motion and neck supple.      Comments: Mid left lower leg tenderness. No foot, ankle, hip, knee tenderness bilaterally. No midline cervical, thoracic, or lumbar TTP; no step-offs or deformities.     Neurological: She is alert and oriented to person, place, and time. She has normal strength. GCS score is 15. GCS eye subscore is 4. GCS verbal subscore is 5. GCS motor subscore is 6.   Skin: Skin is warm and dry. Capillary refill takes less than 2 seconds. No rash noted.   Psychiatric: She has a normal mood and affect. Her behavior is normal.         ED Course    Procedures  Labs Reviewed   POCT URINALYSIS W/O SCOPE - Abnormal       Result Value    Glucose, UA Negative      Bilirubin, UA Negative      Ketones, UA Negative      Spec Grav UA 1.010      Blood, UA 1+ (*)     PH, UA 6.0      Protein, UA Negative      Urobilinogen, UA 0.2      Nitrite, UA Negative      Leukocytes, UA Trace (*)     Color, UA POC Light yellow      Clarity, UA, POC Clear     POCT URINE PREGNANCY    POC Preg Test, Ur Negative       Acceptable Yes     POCT URINALYSIS W/O SCOPE          Imaging Results              X-Ray Tibia Fibula 2 View Left (Final result)  Result time 09/10/24 11:40:14      Final result by Aurelio Salazar MD (09/10/24 11:40:14)                   Impression:      1. No acute displaced fracture, dislocation or suspicious osseous lesion.      Electronically signed by: Aurelio Salazar  Date:    09/10/2024  Time:    11:40               Narrative:    EXAMINATION:  XR TIBIA FIBULA 2 VIEW LEFT    CLINICAL HISTORY:  Pain in left leg    TECHNIQUE:  Three views of the left tibia and fibula    COMPARISON:  None    FINDINGS:  No acute displaced fracture, dislocation or suspicious osseous lesion. Anatomic alignment is maintained.    Regional soft tissues are grossly unremarkable.                                       Medications   ketorolac injection 30 mg (30 mg Intramuscular Given 9/10/24 1130)     Medical Decision Making  Amount and/or Complexity of Data Reviewed  Labs: ordered. Decision-making details documented in ED Course.  Radiology: ordered. Decision-making details documented in ED Course.    Risk  OTC drugs.  Prescription drug management.    Medical Decision Making:    This is an evaluation of a 38 y.o. female that presents to the Emergency Department for   Chief Complaint   Patient presents with    Fall     Pt fell this am hurting left leg            The patient is a non-toxic and well appearing patient. On physical exam, patient appears well hydrated with moist mucus  membranes. Breath sounds are clear and equal bilaterally with no adventitious breath sounds, tachypnea or respiratory distress. Regular rate and rhythm. No murmurs. Abdomen soft and non tender. Patient is tolerating PO without difficulty. Mid left lower leg tenderness. No foot, ankle, hip, knee tenderness bilaterally. No midline cervical, thoracic, or lumbar TTP; no step-offs or deformities. Physical exam otherwise as above.     I have reviewed vital signs and nursing notes.   Vital Signs Are Reassuring.     Based on the patient's symptoms, I am considering and evaluating for the following differential diagnoses: pregnancy, UTI, Sprain, Strain, Contusion, Dislocation, Fracture    Consider hospitalization for: trauma    Patient is agreeable to transfer and admission to [facility]    ED Course:Treatment in the ED included Physical Exam and medications given in ED  Medications   ketorolac injection 30 mg (30 mg Intramuscular Given 9/10/24 1130)   .   Patient reports feeling better after treatment in the ER.       External Data/Documents Reviewed: Previous medical records and vital signs reviewed, see HPI and Physical exam.   Labs: ordered and reviewed.  Leukocytes appreciated on UA.  Radiology: ordered as indicated and reviewed.  No acute fracture appreciated.      Risk  Diagnosis or treatment significantly limited by the following social determinants of health: Body mass index is 42.07 kg/m².     In shared decision making with the patient, we discussed treatment, prescriptions, labs, and imaging results.    Discharge home with   ED Prescriptions       Medication Sig Dispense Start Date End Date Auth. Provider    ibuprofen (ADVIL,MOTRIN) 600 MG tablet Take 1 tablet (600 mg total) by mouth every 6 (six) hours as needed for Pain (Take with food as needed for mild-to-moderate pain). 20 tablet 9/10/2024 -- Ceci Jaramillo,     acetaminophen (TYLENOL) 500 MG tablet Take 2 tablets (1,000 mg total) by mouth every 6 (six) hours as  needed for Pain. 30 tablet 9/10/2024 -- Ceci Jaramillo,     diclofenac sodium (VOLTAREN) 1 % Gel Apply 2 g topically 4 (four) times daily as needed (Apply to painful area up to 4 times a day as needed for pain). Apply to painful area 4 times a day as needed for pain 150 g 9/10/2024 9/20/2024 Ceci Jaramillo DO    cyclobenzaprine (FLEXERIL) 10 MG tablet Take 1 tablet (10 mg total) by mouth 3 (three) times daily as needed for Muscle spasms. 15 tablet 9/10/2024 9/15/2024 Ceci Jaramillo DO    nitrofurantoin, macrocrystal-monohydrate, (MACROBID) 100 MG capsule Take 1 capsule (100 mg total) by mouth 2 (two) times daily. for 5 days 10 capsule 9/10/2024 9/15/2024 Ceci Jaramillo DO          Fill and take prescriptions as directed.  Return to the ED if symptoms worsen or do not resolve.   Answered questions and discussed discharge plan.    Patient reports decreased pain and is ready for discharge.  Follow up with PCP/specialist in 1 day        At time of discharge patient is awake alert oriented x4 speaking clearly in full sentences and moving all 4 extremities.     The following labs and imaging were reviewed:      Admission on 09/10/2024, Discharged on 09/10/2024   Component Date Value Ref Range Status    POC Preg Test, Ur 09/10/2024 Negative  Negative Final     Acceptable 09/10/2024 Yes   Final    Glucose, UA 09/10/2024 Negative  Negative Final    Bilirubin, UA 09/10/2024 Negative  Negative Final    Ketones, UA 09/10/2024 Negative  Negative Final    Spec Grav UA 09/10/2024 1.010  1.005 - 1.030 Final    Blood, UA 09/10/2024 1+ (A)  Negative Final    PH, UA 09/10/2024 6.0  5.0 - 8.0 Final    Protein, UA 09/10/2024 Negative  Negative Final    Urobilinogen, UA 09/10/2024 0.2  <=1.0 E.U./dL Final    Nitrite, UA 09/10/2024 Negative  Negative Final    Leukocytes, UA 09/10/2024 Trace (A)  Negative Final    Color, UA POC 09/10/2024 Light yellow  Yellow, Straw, Lynn Final    Clarity, UA, POC 09/10/2024 Clear  Clear Final         Imaging Results              X-Ray Tibia Fibula 2 View Left (Final result)  Result time 09/10/24 11:40:14      Final result by Aurelio Salazar MD (09/10/24 11:40:14)                   Impression:      1. No acute displaced fracture, dislocation or suspicious osseous lesion.      Electronically signed by: Aurelio Salazar  Date:    09/10/2024  Time:    11:40               Narrative:    EXAMINATION:  XR TIBIA FIBULA 2 VIEW LEFT    CLINICAL HISTORY:  Pain in left leg    TECHNIQUE:  Three views of the left tibia and fibula    COMPARISON:  None    FINDINGS:  No acute displaced fracture, dislocation or suspicious osseous lesion. Anatomic alignment is maintained.    Regional soft tissues are grossly unremarkable.                                            Scribe Attestation:   Scribe #1: I performed the above scribed service and the documentation accurately describes the services I performed. I attest to the accuracy of the note.                            I, Dr. Ceci Jaramillo, personally performed the services described in this documentation. This document was produced by a scribe under my direction and in my presence. All medical record entries made by the scribe were at my direction and in my presence.  I have reviewed the chart and agree that the record reflects my personal performance and is accurate and complete. Ceci Jaramillo DO.     09/14/2024 6:12 AM      Clinical Impression:  Final diagnoses:  [M79.605] Left leg pain  [N39.0] Acute UTI (Primary)  [S80.12XA] Contusion of left lower leg, initial encounter          ED Disposition Condition    Discharge Stable          ED Prescriptions       Medication Sig Dispense Start Date End Date Auth. Provider    ibuprofen (ADVIL,MOTRIN) 600 MG tablet Take 1 tablet (600 mg total) by mouth every 6 (six) hours as needed for Pain (Take with food as needed for mild-to-moderate pain). 20 tablet 9/10/2024 -- Ceci Jaramillo DO    acetaminophen (TYLENOL) 500 MG tablet Take  2 tablets (1,000 mg total) by mouth every 6 (six) hours as needed for Pain. 30 tablet 9/10/2024 -- Ceci Jaramillo DO    diclofenac sodium (VOLTAREN) 1 % Gel Apply 2 g topically 4 (four) times daily as needed (Apply to painful area up to 4 times a day as needed for pain). Apply to painful area 4 times a day as needed for pain 150 g 9/10/2024 9/20/2024 Ceci Jaramillo DO    cyclobenzaprine (FLEXERIL) 10 MG tablet Take 1 tablet (10 mg total) by mouth 3 (three) times daily as needed for Muscle spasms. 15 tablet 9/10/2024 9/15/2024 Ceci Jaramillo DO    nitrofurantoin, macrocrystal-monohydrate, (MACROBID) 100 MG capsule Take 1 capsule (100 mg total) by mouth 2 (two) times daily. for 5 days 10 capsule 9/10/2024 9/15/2024 Ceci Jaramillo DO          Follow-up Information       Follow up With Specialties Details Why Contact Info    Trip Meraz MD Family Medicine Schedule an appointment as soon as possible for a visit in 1 day  [address] [facility]  [place] [id number] [phone]      Oneida Bates MD Orthopedic Surgery Schedule an appointment as soon as possible for a visit in 1 day Follow up with Orthopedics as needed for further leg pain. [address] [facility]  [place] [address]  [phone]               Ceci Jaramillo DO  09/14/24 0612

## 2024-09-10 NOTE — Clinical Note
"Janae "Janae" Jonny was seen and treated in our emergency department on 9/10/2024.  She may return to work on 09/12/2024.       If you have any questions or concerns, please don't hesitate to call.      Ceci Jaramillo, DO"

## 2024-10-09 DIAGNOSIS — R52 PAIN: Primary | ICD-10-CM

## 2024-11-11 ENCOUNTER — DOCUMENT SCAN (OUTPATIENT)
Dept: HOME HEALTH SERVICES | Facility: HOSPITAL | Age: 39
End: 2024-11-11
Payer: MEDICAID

## 2024-11-14 PROBLEM — E78.5 HYPERLIPIDEMIA: Status: ACTIVE | Noted: 2022-12-12

## 2024-11-14 PROBLEM — G47.00 INSOMNIA: Status: ACTIVE | Noted: 2024-10-11

## 2024-12-31 ENCOUNTER — HOSPITAL ENCOUNTER (EMERGENCY)
Facility: HOSPITAL | Age: 39
Discharge: HOME | End: 2024-12-31
Attending: EMERGENCY MEDICINE
Payer: MEDICAID

## 2024-12-31 VITALS
DIASTOLIC BLOOD PRESSURE: 86 MMHG | TEMPERATURE: 99 F | BODY MASS INDEX: 42.33 KG/M2 | OXYGEN SATURATION: 98 % | WEIGHT: 230 LBS | RESPIRATION RATE: 18 BRPM | HEART RATE: 90 BPM | SYSTOLIC BLOOD PRESSURE: 167 MMHG | HEIGHT: 62 IN

## 2024-12-31 DIAGNOSIS — J10.1 INFLUENZA A: Primary | ICD-10-CM

## 2024-12-31 LAB
B-HCG UR QL: NEGATIVE
BILIRUBIN, POC UA: NEGATIVE
BLOOD, POC UA: ABNORMAL
CLARITY, UA: ABNORMAL
COLOR, UA: YELLOW
CTP QC/QA: YES
CTP QC/QA: YES
GLUCOSE, POC UA: NEGATIVE
INFLUENZA A ANTIGEN, POC: POSITIVE
INFLUENZA B ANTIGEN, POC: NEGATIVE
KETONES, POC UA: NEGATIVE
LEUKOCYTE EST, POC UA: NEGATIVE
NITRITE, POC UA: NEGATIVE
PH UR STRIP: 5 [PH] (ref 5–8)
POC RAPID STREP A: NEGATIVE
PROTEIN, POC UA: ABNORMAL
SARS-COV-2 RDRP RESP QL NAA+PROBE: NEGATIVE
SPECIFIC GRAVITY, POC UA: 1.02 (ref 1–1.03)
UROBILINOGEN, POC UA: 0.2 E.U./DL

## 2024-12-31 PROCEDURE — 87635 SARS-COV-2 COVID-19 AMP PRB: CPT | Mod: ER | Performed by: EMERGENCY MEDICINE

## 2024-12-31 PROCEDURE — 87880 STREP A ASSAY W/OPTIC: CPT | Mod: ER

## 2024-12-31 PROCEDURE — 87804 INFLUENZA ASSAY W/OPTIC: CPT | Mod: ER

## 2024-12-31 PROCEDURE — 81003 URINALYSIS AUTO W/O SCOPE: CPT | Mod: ER

## 2024-12-31 PROCEDURE — 99284 EMERGENCY DEPT VISIT MOD MDM: CPT | Mod: ER

## 2024-12-31 PROCEDURE — 25000003 PHARM REV CODE 250: Mod: ER | Performed by: EMERGENCY MEDICINE

## 2024-12-31 PROCEDURE — 81025 URINE PREGNANCY TEST: CPT | Mod: ER | Performed by: EMERGENCY MEDICINE

## 2024-12-31 RX ORDER — OSELTAMIVIR PHOSPHATE 75 MG/1
75 CAPSULE ORAL 2 TIMES DAILY
Qty: 10 CAPSULE | Refills: 0 | Status: SHIPPED | OUTPATIENT
Start: 2024-12-31 | End: 2025-01-05

## 2024-12-31 RX ORDER — BENZONATATE 100 MG/1
200 CAPSULE ORAL
Status: COMPLETED | OUTPATIENT
Start: 2024-12-31 | End: 2024-12-31

## 2024-12-31 RX ORDER — IBUPROFEN 600 MG/1
600 TABLET ORAL EVERY 6 HOURS PRN
Qty: 20 TABLET | Refills: 0 | Status: SHIPPED | OUTPATIENT
Start: 2024-12-31

## 2024-12-31 RX ORDER — ACETAMINOPHEN 500 MG
1000 TABLET ORAL
Status: COMPLETED | OUTPATIENT
Start: 2024-12-31 | End: 2024-12-31

## 2024-12-31 RX ORDER — PROMETHAZINE HYDROCHLORIDE AND DEXTROMETHORPHAN HYDROBROMIDE 6.25; 15 MG/5ML; MG/5ML
7.5 SYRUP ORAL EVERY 4 HOURS PRN
Qty: 200 ML | Refills: 0 | Status: SHIPPED | OUTPATIENT
Start: 2024-12-31 | End: 2025-01-10

## 2024-12-31 RX ORDER — ACETAMINOPHEN 500 MG
1000 TABLET ORAL EVERY 6 HOURS PRN
Qty: 30 TABLET | Refills: 0 | Status: SHIPPED | OUTPATIENT
Start: 2024-12-31

## 2024-12-31 RX ADMIN — BENZONATATE 200 MG: 100 CAPSULE ORAL at 07:12

## 2024-12-31 RX ADMIN — ACETAMINOPHEN 1000 MG: 500 TABLET, FILM COATED ORAL at 07:12

## 2024-12-31 NOTE — ED NOTES
Pt ambulatory to and from restroom to attempt urine collection. States unable to urinate at this time. Given two cups of ice water.

## 2024-12-31 NOTE — Clinical Note
"Janae "Janaegracie Fuller was seen and treated in our emergency department on 12/31/2024.  She may return to work on 01/05/2025.       If you have any questions or concerns, please don't hesitate to call.      Ceci Jaramillo, DO"

## 2024-12-31 NOTE — ED PROVIDER NOTES
Encounter Date: 12/31/2024    SCRIBE #1 NOTE: I, Annie Goodson, am scribing for, and in the presence of,  Ceci Jaramillo DO.       History     Chief Complaint   Patient presents with    Fever     Fever since Friday. Pt states sore throat on Friday with one episode of vomiting both now resolved. Pt states nonproductive cough      Janae Fuller is a 39 y.o. female, with a PMHx of HTN, HLD, who presents to the ED with a chief complaint of a fever x5 days. Patient reports associated cough only when she stands up. She also reports nausea, vomiting,and a sore throat at onset of symptoms which has since resolved. No other exacerbating or alleviating factors. Denies CP, SOB or other associated symptoms. She did not get a flu shot this year. Reports her LMP was the 1st week of December.       The history is provided by the patient. No  was used.     Review of patient's allergies indicates:   Allergen Reactions    Codeine Other (See Comments) and Hives     Reports shakes     Past Medical History:   Diagnosis Date    COVID-19 03/04/2021    Hyperlipidemia     Hypertension     Obesity      Past Surgical History:   Procedure Laterality Date    ANTERIOR CERVICAL DISCECTOMY W/ FUSION N/A 3/8/2024    Procedure: DISCECTOMY, SPINE, CERVICAL, ANTERIOR APPROACH, WITH FUSION SPINEWAVE;  Surgeon: Shila Esquivel MD;  Location: [facility] OR [address] [place] [address];  Service: Neurosurgery;  Laterality: N/A;  C5-C6/C6-C7 Awake A-line MAP>85, caspar head ta, MEPs/SSEPs/EMG, fluoro, possible arthroplasty    JOINT REPLACEMENT Right 2003    Hip replacement     No family history on file.  Social History     Tobacco Use    Smoking status: Never    Smokeless tobacco: Never   Substance Use Topics    Alcohol use: Not Currently    Drug use: Never     Patient gave consent to have physical exam performed.    Review of Systems   Constitutional:  Positive for fever.   HENT:  Positive for sore throat (resolved). Negative for rhinorrhea.    Eyes:  Negative  for redness.   Respiratory:  Positive for cough. Negative for shortness of breath.    Cardiovascular:  Negative for chest pain and leg swelling.   Gastrointestinal:  Positive for nausea (resolved) and vomiting (resolved). Negative for abdominal pain and diarrhea.   Musculoskeletal:  Negative for back pain.   Skin:  Negative for rash.   Neurological:  Negative for syncope and headaches.   All other systems reviewed and are negative.      Physical Exam     Initial Vitals [12/31/24 0728]   BP Pulse Resp Temp SpO2   (!) 159/87 (!) 113 18 (!) 102.2 °F (39 °C) 99 %      MAP       --         Physical Exam    Nursing note and vitals reviewed.  Constitutional: She appears well-developed and well-nourished.   Febrile, warm to touch.   HENT:   Head: Normocephalic and atraumatic.   Right Ear: Tympanic membrane and external ear normal.   Left Ear: Tympanic membrane and external ear normal.   Nose: Nose normal. Mouth/Throat: Posterior oropharyngeal edema and posterior oropharyngeal erythema present. No oropharyngeal exudate.   Eyes: Conjunctivae are normal.   Neck: Phonation normal. Neck supple.   Normal range of motion.  Cardiovascular:  Normal rate, regular rhythm, normal heart sounds and intact distal pulses.     Exam reveals no gallop and no friction rub.       No murmur heard.  Pulmonary/Chest: Effort normal and breath sounds normal. No stridor. No respiratory distress. She has no wheezes. She has no rhonchi. She has no rales. She exhibits no tenderness.   Abdominal: Abdomen is soft. Bowel sounds are normal. She exhibits no distension. There is no abdominal tenderness. There is no rigidity, no rebound and no guarding.   Musculoskeletal:         General: No tenderness or edema. Normal range of motion.      Cervical back: Normal range of motion and neck supple.     Neurological: She is alert and oriented to person, place, and time. She has normal strength. No cranial nerve deficit or sensory deficit. GCS score is 15. GCS eye  subscore is 4. GCS verbal subscore is 5. GCS motor subscore is 6.   Skin: Skin is warm and dry. Capillary refill takes less than 2 seconds. No rash noted.   Psychiatric: She has a normal mood and affect. Her behavior is normal.         ED Course   Procedures  Labs Reviewed   POCT RAPID INFLUENZA A/B - Abnormal       Result Value    Influenza B Ag negative      Inflenza A Ag positive (*)    POCT URINALYSIS W/O SCOPE - Abnormal    Glucose, UA Negative      Bilirubin, UA Negative      Ketones, UA Negative      Spec Grav UA 1.025      Blood, UA 1+ (*)     PH, UA 5.0      Protein, UA 2+ (*)     Urobilinogen, UA 0.2      Nitrite, UA Negative      Leukocytes, UA Negative      Color, UA POC Yellow      Clarity, UA, POC Slightly Cloudy     SARS-COV-2 RDRP GENE    POC Rapid COVID Negative       Acceptable Yes      Narrative:     This test utilizes isothermal nucleic acid amplification technology to detect the SARS-CoV-2 RdRp nucleic acid segment. The analytical sensitivity (limit of detection) is 500 copies/swab.     A POSITIVE result is indicative of the presence of SARS-CoV-2 RNA; clinical correlation with patient history and other diagnostic information is necessary to determine patient infection status.    A NEGATIVE result means that SARS-CoV-2 nucleic acids are not present above the limit of detection. A NEGATIVE result should be treated as presumptive. It does not rule out the possibility of COVID-19 and should not be the sole basis for treatment decisions. If COVID-19 is strongly suspected based on clinical and exposure history, re-testing using an alternate molecular assay should be considered.     Commercial kits are provided by [employer].       POCT URINE PREGNANCY    POC Preg Test, Ur Negative       Acceptable Yes     POCT INFLUENZA A/B MOLECULAR   POCT STREP A MOLECULAR   POCT STREP A, RAPID    POC Rapid Strep A negative     POCT URINALYSIS W/O SCOPE          Imaging Results     None          Medications   acetaminophen tablet 1,000 mg (1,000 mg Oral Given 12/31/24 0745)   benzonatate capsule 200 mg (200 mg Oral Given 12/31/24 0745)     Medical Decision Making  Amount and/or Complexity of Data Reviewed  Labs: ordered.    Risk  OTC drugs.  Prescription drug management.    Medical Decision Making:    This is an evaluation of a 39 y.o. female that presents to the Emergency Department for   Chief Complaint   Patient presents with    Fever     Fever since Friday. Pt states sore throat on Friday with one episode of vomiting both now resolved. Pt states nonproductive cough          The patient is a non-toxic and well appearing patient. On physical exam, patient is febrile and warm to touch. She appears well hydrated with moist mucus membranes. Breath sounds are clear and equal bilaterally with no adventitious breath sounds, tachypnea or respiratory distress. Regular rate and rhythm. No murmurs. Abdomen soft and non tender. Patient is tolerating PO without difficulty. Erythema and edema to the posterior oropharynx, no exudate.  Physical exam otherwise as above.     I have reviewed vital signs and nursing notes.   Vital Signs Are Reassuring.     Based on the patient's symptoms, I am considering and evaluating for the following differential diagnoses: pregnancy, Viral Illness, Influenza A, Influenza B, Streptococcal Pharyngitis, COVID-19, UTI    Consider hospitalization for:  Fever    Patient is agreeable to transfer and admission to [facility] if necessary    ED Course:Treatment in the ED included Physical Exam and medications given in ED  Medications   acetaminophen tablet 1,000 mg (1,000 mg Oral Given 12/31/24 0745)   benzonatate capsule 200 mg (200 mg Oral Given 12/31/24 0745)   .   Patient reports feeling better after treatment in the ER.       External Data/Documents Reviewed: Previous medical records and vital signs reviewed, see HPI and Physical exam.   Labs: ordered and  reviewed.  Pregnancy test negative.  Flu A positive..      Cardiac monitor placed for fever. Monitor shows Sinus Tachycardia with  rate of 103. Interpreted by Dr. Ceci Jaramillo DO.    Risk  Diagnosis or treatment significantly limited by the following social determinants of health: Body mass index is 42.07 kg/m².     In shared decision making with the patient, we discussed treatment, prescriptions, labs, and imaging results.    Discharge home with   ED Prescriptions       Medication Sig Dispense Start Date End Date Auth. Provider    oseltamivir (TAMIFLU) 75 MG capsule Take 1 capsule (75 mg total) by mouth 2 (two) times daily. for 5 days 10 capsule 12/31/2024 1/5/2025 Ceci Jaramillo DO    ibuprofen (ADVIL,MOTRIN) 600 MG tablet Take 1 tablet (600 mg total) by mouth every 6 (six) hours as needed for Pain (Take with food as needed for mild-to-moderate pain). 20 tablet 12/31/2024 -- Ceci Jaramillo DO    acetaminophen (TYLENOL) 500 MG tablet Take 2 tablets (1,000 mg total) by mouth every 6 (six) hours as needed for Pain (As needed for pain and fever). 30 tablet 12/31/2024 -- Ceci Jaramillo DO    promethazine-dextromethorphan (PROMETHAZINE-DM) 6.25-15 mg/5 mL Syrp Take 7.5 mLs by mouth every 4 (four) hours as needed (cougn and congestion). 200 mL 12/31/2024 1/10/2025 Ceci Jaramillo DO          Fill and take prescriptions as directed.  Return to the ED if symptoms worsen or do not resolve.   Answered questions and discussed discharge plan.    Patient reports resolution  of symptoms and is ready for discharge.  Follow up with PCP/specialist in 1 day      At time of discharge patient is awake alert oriented x4 speaking clearly in full sentences and moving all 4 extremities.     The following labs and imaging were reviewed:      Admission on 12/31/2024   Component Date Value Ref Range Status    POC Rapid COVID 12/31/2024 Negative  Negative Final     Acceptable 12/31/2024 Yes   Final    POC Preg Test, Ur 12/31/2024  Negative  Negative Final     Acceptable 12/31/2024 Yes   Final    POC Rapid Strep A 12/31/2024 negative  Positive/Negative Final    Influenza B Ag 12/31/2024 negative  Positive/Negative Final    Inflenza A Ag 12/31/2024 positive (A)  Positive/Negative Final    Glucose, UA 12/31/2024 Negative  Negative Final    Bilirubin, UA 12/31/2024 Negative  Negative Final    Ketones, UA 12/31/2024 Negative  Negative Final    Spec Grav UA 12/31/2024 1.025  1.005 - 1.030 Final    Blood, UA 12/31/2024 1+ (A)  Negative Final    PH, UA 12/31/2024 5.0  5.0 - 8.0 Final    Protein, UA 12/31/2024 2+ (A)  Negative Final    Urobilinogen, UA 12/31/2024 0.2  <=1.0 E.U./dL Final    Nitrite, UA 12/31/2024 Negative  Negative Final    Leukocytes, UA 12/31/2024 Negative  Negative Final    Color, UA POC 12/31/2024 Yellow  Yellow, Straw, Lynn Final    Clarity, UA, POC 12/31/2024 Slightly Cloudy  Clear Final        Imaging Results    None               Scribe Attestation:   Scribe #1: I performed the above scribed service and the documentation accurately describes the services I performed. I attest to the accuracy of the note.                              I, Dr. Ceci Jaramillo, personally performed the services described in this documentation. This document was produced by a scribe under my direction and in my presence. All medical record entries made by the scribe were at my direction and in my presence.  I have reviewed the chart and agree that the record reflects my personal performance and is accurate and complete. Ceci Jaramillo, DO.     12/31/2024 9:24 AM    Clinical Impression:  Final diagnoses:  [J10.1] Influenza A (Primary)          ED Disposition Condition    Discharge Stable          ED Prescriptions       Medication Sig Dispense Start Date End Date Auth. Provider    oseltamivir (TAMIFLU) 75 MG capsule Take 1 capsule (75 mg total) by mouth 2 (two) times daily. for 5 days 10 capsule 12/31/2024 1/5/2025 Ceci Jaramillo,  DO    ibuprofen (ADVIL,MOTRIN) 600 MG tablet Take 1 tablet (600 mg total) by mouth every 6 (six) hours as needed for Pain (Take with food as needed for mild-to-moderate pain). 20 tablet 12/31/2024 -- Ceci Jaramlilo DO    acetaminophen (TYLENOL) 500 MG tablet Take 2 tablets (1,000 mg total) by mouth every 6 (six) hours as needed for Pain (As needed for pain and fever). 30 tablet 12/31/2024 -- Ceci Jaramillo DO    promethazine-dextromethorphan (PROMETHAZINE-DM) 6.25-15 mg/5 mL Syrp Take 7.5 mLs by mouth every 4 (four) hours as needed (cougn and congestion). 200 mL 12/31/2024 1/10/2025 Ceci Jaramillo DO          Follow-up Information       Follow up With Specialties Details Why Contact Info    Trip Meraz MD Family Medicine Schedule an appointment as soon as possible for a visit in 1 day  [address] [facility]  [place] [id number] [phone]      [facility] - Emergency Dept Emergency Medicine Go to  Please go to [facility] emergency department if symptoms worsen [address] [facility] [place] [address] [phone]             Ceci Jaramillo DO  12/31/24 9967

## 2024-12-31 NOTE — ED NOTES
PT ambulatory to and from restroom to attempt urine collection again. Reports unable to urinate. Given more ice water.

## 2025-06-26 ENCOUNTER — TELEPHONE (OUTPATIENT)
Dept: NEUROSURGERY | Facility: CLINIC | Age: 40
End: 2025-06-26
Payer: MEDICAID

## 2025-06-26 DIAGNOSIS — M54.2 CERVICALGIA: Primary | ICD-10-CM

## 2025-06-26 NOTE — TELEPHONE ENCOUNTER
Got pt scheduled for an appointment with alex baez along with a ct c spine. Pt claims to be experiencing sharp pain in neck    Copied from CRM #[id number]. Topic: Appointments - Appointment Scheduling  >> Jun 26, 2025  3:01 PM Adeel wrote:  Type:  Sooner Apoointment Request    Caller is requesting a sooner appointment.   Name of Caller:pt   When is the first available appointment?none  Symptoms:neck pain   Would the patient rather a call back or a response via [facility]? Call   Best Call Back Number:[phone]   Additional Information:

## 2025-07-08 ENCOUNTER — HOSPITAL ENCOUNTER (OUTPATIENT)
Dept: RADIOLOGY | Facility: HOSPITAL | Age: 40
Discharge: HOME | End: 2025-07-08
Attending: STUDENT IN AN ORGANIZED HEALTH CARE EDUCATION/TRAINING PROGRAM
Payer: MEDICAID

## 2025-07-08 ENCOUNTER — OFFICE VISIT (OUTPATIENT)
Dept: NEUROSURGERY | Facility: CLINIC | Age: 40
End: 2025-07-08
Payer: MEDICAID

## 2025-07-08 VITALS
DIASTOLIC BLOOD PRESSURE: 111 MMHG | SYSTOLIC BLOOD PRESSURE: 158 MMHG | WEIGHT: 229.94 LBS | TEMPERATURE: 98 F | HEART RATE: 61 BPM | BODY MASS INDEX: 42.06 KG/M2

## 2025-07-08 DIAGNOSIS — Z98.1 S/P CERVICAL SPINAL FUSION: ICD-10-CM

## 2025-07-08 DIAGNOSIS — M54.2 CERVICALGIA: ICD-10-CM

## 2025-07-08 DIAGNOSIS — M54.2 CERVICALGIA: Primary | ICD-10-CM

## 2025-07-08 PROCEDURE — 72125 CT NECK SPINE W/O DYE: CPT | Mod: TC

## 2025-07-08 PROCEDURE — 99999 PR PBB SHADOW E&M-EST. PATIENT-LVL III: CPT | Mod: PBBFAC,,, | Performed by: NURSE PRACTITIONER

## 2025-07-08 PROCEDURE — 4010F ACE/ARB THERAPY RXD/TAKEN: CPT | Mod: CPTII,,, | Performed by: NURSE PRACTITIONER

## 2025-07-08 PROCEDURE — 99213 OFFICE O/P EST LOW 20 MIN: CPT | Mod: S$PBB,,, | Performed by: NURSE PRACTITIONER

## 2025-07-08 PROCEDURE — 3080F DIAST BP >= 90 MM HG: CPT | Mod: CPTII,,, | Performed by: NURSE PRACTITIONER

## 2025-07-08 PROCEDURE — 3008F BODY MASS INDEX DOCD: CPT | Mod: CPTII,,, | Performed by: NURSE PRACTITIONER

## 2025-07-08 PROCEDURE — 1159F MED LIST DOCD IN RCRD: CPT | Mod: CPTII,,, | Performed by: NURSE PRACTITIONER

## 2025-07-08 PROCEDURE — 72125 CT NECK SPINE W/O DYE: CPT | Mod: 26,,, | Performed by: RADIOLOGY

## 2025-07-08 PROCEDURE — 99213 OFFICE O/P EST LOW 20 MIN: CPT | Mod: PBBFAC,25 | Performed by: NURSE PRACTITIONER

## 2025-07-08 PROCEDURE — 1160F RVW MEDS BY RX/DR IN RCRD: CPT | Mod: CPTII,,, | Performed by: NURSE PRACTITIONER

## 2025-07-08 PROCEDURE — 3077F SYST BP >= 140 MM HG: CPT | Mod: CPTII,,, | Performed by: NURSE PRACTITIONER

## 2025-07-08 RX ORDER — METHOCARBAMOL 500 MG/1
500 TABLET, FILM COATED ORAL 4 TIMES DAILY
Qty: 40 TABLET | Refills: 0 | Status: SHIPPED | OUTPATIENT
Start: 2025-07-08 | End: 2025-07-18

## 2025-07-08 RX ORDER — METHYLPREDNISOLONE 4 MG/1
TABLET ORAL
Qty: 21 EACH | Refills: 0 | Status: SHIPPED | OUTPATIENT
Start: 2025-07-08 | End: 2025-07-29

## 2025-07-08 NOTE — PROGRESS NOTES
Neurosurgery  History & Physical    SUBJECTIVE:     History of Present Illness  Janae Fuller is a 39 y.o. female presents today to discuss concerns with persistent right sided neck pain for the past week. Denies trauma. Reports recently moving and sleeping on her couch which might have exacerbated her pain. She has a hx of C5-C6 ACDR and C6-C7 ACDF on 3/8/24 with Dr. Esquivel. Describes the pain as aching. Rates the pain as a 1/10. Aggravating factors include movement. Alleviating factors include rest and Aleve. Denies weakness, numbness or tingling, b/b dysfunction, saddle anesthesia, or gait instability.        Review of patient's allergies indicates:   Allergen Reactions    Codeine Other (See Comments) and Hives     Reports shakes       Current Medications[1]    Past Medical History:   Diagnosis Date    COVID-19 03/04/2021    Hyperlipidemia     Hypertension     Obesity      Past Surgical History:   Procedure Laterality Date    ANTERIOR CERVICAL DISCECTOMY W/ FUSION N/A 3/8/2024    Procedure: DISCECTOMY, SPINE, CERVICAL, ANTERIOR APPROACH, WITH FUSION SPINEWAVE;  Surgeon: Shila Esquivel MD;  Location: [facility] OR [address] [place] [address];  Service: Neurosurgery;  Laterality: N/A;  C5-C6/C6-C7 Awake A-line MAP>85, caspar head ta, MEPs/SSEPs/EMG, fluoro, possible arthroplasty    JOINT REPLACEMENT Right 2003    Hip replacement     Family History    None       Social History     Socioeconomic History    Marital status: Single   Tobacco Use    Smoking status: Never    Smokeless tobacco: Never   Substance and Sexual Activity    Alcohol use: Not Currently    Drug use: Never    Sexual activity: Yes     Partners: Male     Social Drivers of Health     Financial Resource Strain: Low Risk  (4/9/2025)    Received from [facility]    Overall Financial Resource Strain (CARDIA)     Difficulty of Paying Living Expenses: Not hard at all   Food Insecurity: No Food Insecurity (4/9/2025)    Received from [facility]    Hunger Vital Sign     Worried About  Running Out of Food in the Last Year: Never true     Ran Out of Food in the Last Year: Never true   Transportation Needs: No Transportation Needs (4/9/2025)    Received from [facility]    PRAPARE - Transportation     Lack of Transportation (Medical): No     Lack of Transportation (Non-Medical): No   Physical Activity: Unknown (4/9/2025)    Received from [facility]    Exercise Vital Sign     Days of Exercise per Week: 2 days   Stress: No Stress Concern Present (4/9/2025)    Received from [facility]    [employer] - Occupational Stress Questionnaire     Feeling of Stress : Not at all   Housing Stability: Unknown (4/9/2025)    Received from [facility]    Housing Stability Vital Sign     Unable to Pay for Housing in the Last Year: No       Review of Systems    OBJECTIVE:     Vital Signs  Temp: 97.9 °F (36.6 °C)  Pulse: 61  BP: (!) 158/111  Pain Score: 1   Weight: 104.3 kg (229 lb 15 oz)  Body mass index is 42.06 kg/m².      Neurosurgery Physical Exam  General: well developed, well nourished, no distress.   Head: normocephalic, atraumatic  Neurologic: Alert and oriented. Thought content appropriate.  GCS: Motor: 6/Verbal: 5/Eyes: 4 GCS Total: 15  Mental Status: Awake, Alert, Oriented x 4  Language: No aphasia  Speech: No dysarthria  Cranial nerves: face symmetric, tongue midline, CN II-XII grossly intact.   Eyes: pupils equal, round, reactive to light with accomodation, EOMI.   Pulmonary: normal respirations, no signs of respiratory distress  Abdomen: soft, non-distended  Skin: Skin is warm, dry and intact.  Sensory: intact to light touch throughout  Motor Strength:Moves all extremities spontaneously with good tone.    Strength  Deltoids Triceps Biceps Wrist Extension Wrist Flexion Hand    Upper: R 5/5 5/5 5/5 5/5 5/5 5/5    L 5/5 5/5 5/5 5/5 5/5 5/5     HF KE KF DF PF EHL   Lower: R 5/5 5/5 5/5 5/5 5/5 5/5    L 5/5 5/5 5/5 5/5 5/5 5/5     Reflexes:   Crawford's: Positive  RIGHT    Cerebellar:   Gait stable     Cervical:   ROM: Pain limited with flexion, extension, lateral rotation and ear-to-shoulder bend.   Midline TTP: Negative. Pain to palpation to paraspinal muscles on the RIGHT    Diagnostic Results:  I have personally reviewed the CT cervical spine dated 7/8/25 which shows discectomy at C5-C6 and C6-C7 as well as C6-C7 ACDF. Stable position of the hardware with no loosening or fracture. Mild multilevel degenerative changes.    ASSESSMENT/PLAN:     Janae Fuller is a 39 y.o. female presents today to discuss concerns with persistent right sided neck pain for the past week. We discussed that her imaging is stable and likely a flare-up. I have prescribed additional medications as well as PT. I would like the patient to follow-up in clinic as needed. I have encouraged her to contact the clinic with any questions, concerns, or adverse clinical changes. She verbalized understanding.               SHAHID Medina-ELIZA  Neurosurgery  [facility] [place].             [1]   Current Outpatient Medications   Medication Sig Dispense Refill    acetaminophen (TYLENOL) 500 MG tablet Take 2 tablets (1,000 mg total) by mouth every 6 (six) hours as needed for Pain (As needed for pain and fever). 30 tablet 0    DULoxetine (CYMBALTA) 30 MG capsule Take 1 capsule by mouth every morning.      hydroCHLOROthiazide (HYDRODIURIL) 12.5 MG Tab Take 1 tablet by mouth every morning.      hydrOXYzine HCL (ATARAX) 25 MG tablet Take 1 tablet by mouth nightly as needed.      ibuprofen (ADVIL,MOTRIN) 600 MG tablet Take 1 tablet (600 mg total) by mouth every 6 (six) hours as needed for Pain (Take with food as needed for mild-to-moderate pain). 20 tablet 0    meloxicam (MOBIC) 15 MG tablet Take 1 tablet by mouth once daily.      methocarbamoL (ROBAXIN) 750 MG Tab Take 750 mg by mouth 3 (three) times daily as needed.      olmesartan (BENICAR) 40 MG tablet Take 40 mg by mouth once daily.      rosuvastatin  (CRESTOR) 10 MG tablet Take 10 mg by mouth every evening.      diclofenac sodium (VOLTAREN) 1 % Gel Apply 2 g topically 4 (four) times daily as needed (Apply to painful area up to 4 times a day as needed for pain). Apply to painful area 4 times a day as needed for pain 150 g 0     No current facility-administered medications for this visit.

## (undated) DEVICE — BLADE 4IN EDGE INSULATED

## (undated) DEVICE — BURR RND FLUT SFT TOUCH 2.0MM

## (undated) DEVICE — DURAPREP SURG SCRUB 26ML

## (undated) DEVICE — DRAPE C-ARM ELAS CLIP 42X120IN

## (undated) DEVICE — DIFFUSER

## (undated) DEVICE — DRESSING TRANS 4X4 TEGADERM

## (undated) DEVICE — DRAPE OPMI STERILE

## (undated) DEVICE — TUBE FRAZIER 5MM 2FT SOFT TIP

## (undated) DEVICE — STRIP MEDI WND CLSR 1/2X4IN

## (undated) DEVICE — KIT EVACUATOR 3-SPRING 1/8 DRN

## (undated) DEVICE — TRAY NEURO OMC

## (undated) DEVICE — MARKER SKIN STND TIP BLUE BARR

## (undated) DEVICE — CARTRIDGE OIL

## (undated) DEVICE — RESTRAINT LIMB HOLDER ADJ FOAM

## (undated) DEVICE — DRAPE STERI-DRAPE 1000 17X11IN

## (undated) DEVICE — SPONGE COTTON TRAY 4X4IN

## (undated) DEVICE — TAPE SILK 3IN

## (undated) DEVICE — KIT SURGIFLO HEMOSTATIC MATRIX

## (undated) DEVICE — SUT MONOCRYL 4-0 PS-1 UND

## (undated) DEVICE — SUT CTD VICRYL 3-0 CR/SH

## (undated) DEVICE — ELECTRODE REM PLYHSV RETURN 9

## (undated) DEVICE — CORD BIPOLAR 12 FOOT

## (undated) DEVICE — SPONGE GAUZE 16PLY 4X4

## (undated) DEVICE — NDL SPINAL 18GX3.5 SPINOCAN

## (undated) DEVICE — PACK ECLIPSE SET-UP W/O DRAPE

## (undated) DEVICE — GAUZE SPONGE PEANUT STRL

## (undated) DEVICE — DRAPE T THYROID STERILE

## (undated) DEVICE — ADHESIVE MASTISOL VIAL 48/BX

## (undated) DEVICE — BUR BONE CUT MICRO TPS 3X3.8MM

## (undated) DEVICE — DRAPE TOP 53X102IN